# Patient Record
Sex: FEMALE | Race: WHITE | HISPANIC OR LATINO | Employment: UNEMPLOYED | URBAN - METROPOLITAN AREA
[De-identification: names, ages, dates, MRNs, and addresses within clinical notes are randomized per-mention and may not be internally consistent; named-entity substitution may affect disease eponyms.]

---

## 2018-05-14 ENCOUNTER — APPOINTMENT (OUTPATIENT)
Dept: RADIOLOGY | Facility: MEDICAL CENTER | Age: 28
End: 2018-05-14
Attending: EMERGENCY MEDICINE
Payer: MEDICAID

## 2018-05-14 ENCOUNTER — HOSPITAL ENCOUNTER (EMERGENCY)
Facility: MEDICAL CENTER | Age: 28
End: 2018-05-15
Attending: EMERGENCY MEDICINE
Payer: MEDICAID

## 2018-05-14 DIAGNOSIS — T81.89XA GRANULOMA OF SURGICAL WOUND, INITIAL ENCOUNTER: ICD-10-CM

## 2018-05-14 DIAGNOSIS — M54.50 ACUTE BILATERAL LOW BACK PAIN WITHOUT SCIATICA: ICD-10-CM

## 2018-05-14 LAB
ALBUMIN SERPL BCP-MCNC: 4.4 G/DL (ref 3.2–4.9)
ALBUMIN/GLOB SERPL: 1.5 G/DL
ALP SERPL-CCNC: 41 U/L (ref 30–99)
ALT SERPL-CCNC: 14 U/L (ref 2–50)
ANION GAP SERPL CALC-SCNC: 9 MMOL/L (ref 0–11.9)
APPEARANCE UR: CLEAR
AST SERPL-CCNC: 23 U/L (ref 12–45)
BACTERIA #/AREA URNS HPF: ABNORMAL /HPF
BASOPHILS # BLD AUTO: 0.4 % (ref 0–1.8)
BASOPHILS # BLD: 0.04 K/UL (ref 0–0.12)
BILIRUB SERPL-MCNC: 1.1 MG/DL (ref 0.1–1.5)
BILIRUB UR QL STRIP.AUTO: NEGATIVE
BUN SERPL-MCNC: 12 MG/DL (ref 8–22)
CALCIUM SERPL-MCNC: 9.3 MG/DL (ref 8.4–10.2)
CHLORIDE SERPL-SCNC: 102 MMOL/L (ref 96–112)
CO2 SERPL-SCNC: 25 MMOL/L (ref 20–33)
COLOR UR: YELLOW
CREAT SERPL-MCNC: 0.71 MG/DL (ref 0.5–1.4)
EOSINOPHIL # BLD AUTO: 0.08 K/UL (ref 0–0.51)
EOSINOPHIL NFR BLD: 0.9 % (ref 0–6.9)
EPI CELLS #/AREA URNS HPF: ABNORMAL /HPF
ERYTHROCYTE [DISTWIDTH] IN BLOOD BY AUTOMATED COUNT: 39.7 FL (ref 35.9–50)
GLOBULIN SER CALC-MCNC: 2.9 G/DL (ref 1.9–3.5)
GLUCOSE SERPL-MCNC: 93 MG/DL (ref 65–99)
GLUCOSE UR STRIP.AUTO-MCNC: NEGATIVE MG/DL
HCG UR QL: NEGATIVE
HCT VFR BLD AUTO: 43.2 % (ref 37–47)
HGB BLD-MCNC: 15 G/DL (ref 12–16)
IMM GRANULOCYTES # BLD AUTO: 0.01 K/UL (ref 0–0.11)
IMM GRANULOCYTES NFR BLD AUTO: 0.1 % (ref 0–0.9)
KETONES UR STRIP.AUTO-MCNC: NEGATIVE MG/DL
LEUKOCYTE ESTERASE UR QL STRIP.AUTO: NEGATIVE
LIPASE SERPL-CCNC: 27 U/L (ref 7–58)
LYMPHOCYTES # BLD AUTO: 2.57 K/UL (ref 1–4.8)
LYMPHOCYTES NFR BLD: 28.2 % (ref 22–41)
MCH RBC QN AUTO: 32.4 PG (ref 27–33)
MCHC RBC AUTO-ENTMCNC: 34.7 G/DL (ref 33.6–35)
MCV RBC AUTO: 93.3 FL (ref 81.4–97.8)
MICRO URNS: ABNORMAL
MONOCYTES # BLD AUTO: 0.58 K/UL (ref 0–0.85)
MONOCYTES NFR BLD AUTO: 6.4 % (ref 0–13.4)
MUCOUS THREADS #/AREA URNS HPF: ABNORMAL /HPF
NEUTROPHILS # BLD AUTO: 5.83 K/UL (ref 2–7.15)
NEUTROPHILS NFR BLD: 64 % (ref 44–72)
NITRITE UR QL STRIP.AUTO: NEGATIVE
NRBC # BLD AUTO: 0 K/UL
NRBC BLD-RTO: 0 /100 WBC
PH UR STRIP.AUTO: 6.5 [PH]
PLATELET # BLD AUTO: 210 K/UL (ref 164–446)
PMV BLD AUTO: 10.5 FL (ref 9–12.9)
POTASSIUM SERPL-SCNC: 3.1 MMOL/L (ref 3.6–5.5)
PROT SERPL-MCNC: 7.3 G/DL (ref 6–8.2)
PROT UR QL STRIP: NEGATIVE MG/DL
RBC # BLD AUTO: 4.63 M/UL (ref 4.2–5.4)
RBC # URNS HPF: ABNORMAL /HPF
RBC UR QL AUTO: ABNORMAL
SODIUM SERPL-SCNC: 136 MMOL/L (ref 135–145)
SP GR UR REFRACTOMETRY: 1.01
SP GR UR STRIP.AUTO: 1.01
WBC # BLD AUTO: 9.1 K/UL (ref 4.8–10.8)
WBC #/AREA URNS HPF: ABNORMAL /HPF

## 2018-05-14 PROCEDURE — 81025 URINE PREGNANCY TEST: CPT

## 2018-05-14 PROCEDURE — 80053 COMPREHEN METABOLIC PANEL: CPT

## 2018-05-14 PROCEDURE — 85025 COMPLETE CBC W/AUTO DIFF WBC: CPT

## 2018-05-14 PROCEDURE — 96374 THER/PROPH/DIAG INJ IV PUSH: CPT

## 2018-05-14 PROCEDURE — 76857 US EXAM PELVIC LIMITED: CPT

## 2018-05-14 PROCEDURE — 99285 EMERGENCY DEPT VISIT HI MDM: CPT

## 2018-05-14 PROCEDURE — 83690 ASSAY OF LIPASE: CPT

## 2018-05-14 PROCEDURE — 81001 URINALYSIS AUTO W/SCOPE: CPT

## 2018-05-14 ASSESSMENT — PAIN SCALES - GENERAL
PAINLEVEL_OUTOF10: 6
PAINLEVEL_OUTOF10: 6

## 2018-05-15 VITALS
WEIGHT: 113.98 LBS | OXYGEN SATURATION: 100 % | HEART RATE: 66 BPM | RESPIRATION RATE: 18 BRPM | SYSTOLIC BLOOD PRESSURE: 161 MMHG | TEMPERATURE: 98.2 F | HEIGHT: 65 IN | BODY MASS INDEX: 18.99 KG/M2 | DIASTOLIC BLOOD PRESSURE: 105 MMHG

## 2018-05-15 PROCEDURE — 700111 HCHG RX REV CODE 636 W/ 250 OVERRIDE (IP)

## 2018-05-15 RX ORDER — NAPROXEN 500 MG/1
500 TABLET ORAL 2 TIMES DAILY WITH MEALS
Qty: 20 TAB | Refills: 0 | Status: SHIPPED | OUTPATIENT
Start: 2018-05-15 | End: 2018-05-25

## 2018-05-15 RX ORDER — KETOROLAC TROMETHAMINE 30 MG/ML
30 INJECTION, SOLUTION INTRAMUSCULAR; INTRAVENOUS ONCE
Status: COMPLETED | OUTPATIENT
Start: 2018-05-15 | End: 2018-05-15

## 2018-05-15 RX ORDER — KETOROLAC TROMETHAMINE 30 MG/ML
INJECTION, SOLUTION INTRAMUSCULAR; INTRAVENOUS
Status: COMPLETED
Start: 2018-05-15 | End: 2018-05-15

## 2018-05-15 RX ADMIN — KETOROLAC TROMETHAMINE 30 MG: 30 INJECTION, SOLUTION INTRAMUSCULAR at 00:27

## 2018-05-15 RX ADMIN — KETOROLAC TROMETHAMINE 30 MG: 30 INJECTION, SOLUTION INTRAMUSCULAR; INTRAVENOUS at 00:27

## 2018-05-15 ASSESSMENT — PAIN SCALES - GENERAL: PAINLEVEL_OUTOF10: 7

## 2018-05-15 NOTE — DISCHARGE INSTRUCTIONS
Return immediately to the Emergency Department if you experience continuing or worsening discomfort in your abdomen or back, fever, chest pain, difficulty breathing or any other new or worsening symptoms.       Back Pain, Adult  Back pain is very common in adults. The cause of back pain is rarely dangerous and the pain often gets better over time. The cause of your back pain may not be known. Some common causes of back pain include:  · Strain of the muscles or ligaments supporting the spine.  · Wear and tear (degeneration) of the spinal disks.  · Arthritis.  · Direct injury to the back.  For many people, back pain may return. Since back pain is rarely dangerous, most people can learn to manage this condition on their own.  Follow these instructions at home:  Watch your back pain for any changes. The following actions may help to lessen any discomfort you are feeling:  · Remain active. It is stressful on your back to sit or  one place for long periods of time. Do not sit, drive, or  one place for more than 30 minutes at a time. Take short walks on even surfaces as soon as you are able. Try to increase the length of time you walk each day.  · Exercise regularly as directed by your health care provider. Exercise helps your back heal faster. It also helps avoid future injury by keeping your muscles strong and flexible.  · Do not stay in bed. Resting more than 1-2 days can delay your recovery.  · Pay attention to your body when you bend and lift. The most comfortable positions are those that put less stress on your recovering back. Always use proper lifting techniques, including:  ¨ Bending your knees.  ¨ Keeping the load close to your body.  ¨ Avoiding twisting.  · Find a comfortable position to sleep. Use a firm mattress and lie on your side with your knees slightly bent. If you lie on your back, put a pillow under your knees.  · Avoid feeling anxious or stressed. Stress increases muscle tension and can  worsen back pain. It is important to recognize when you are anxious or stressed and learn ways to manage it, such as with exercise.  · Take medicines only as directed by your health care provider. Over-the-counter medicines to reduce pain and inflammation are often the most helpful. Your health care provider may prescribe muscle relaxant drugs. These medicines help dull your pain so you can more quickly return to your normal activities and healthy exercise.  · Apply ice to the injured area:  ¨ Put ice in a plastic bag.  ¨ Place a towel between your skin and the bag.  ¨ Leave the ice on for 20 minutes, 2-3 times a day for the first 2-3 days. After that, ice and heat may be alternated to reduce pain and spasms.  · Maintain a healthy weight. Excess weight puts extra stress on your back and makes it difficult to maintain good posture.  Contact a health care provider if:  · You have pain that is not relieved with rest or medicine.  · You have increasing pain going down into the legs or buttocks.  · You have pain that does not improve in one week.  · You have night pain.  · You lose weight.  · You have a fever or chills.  Get help right away if:  · You develop new bowel or bladder control problems.  · You have unusual weakness or numbness in your arms or legs.  · You develop nausea or vomiting.  · You develop abdominal pain.  · You feel faint.  This information is not intended to replace advice given to you by your health care provider. Make sure you discuss any questions you have with your health care provider.  Document Released: 12/18/2006 Document Revised: 04/27/2017 Document Reviewed: 04/21/2015  Elsevier Interactive Patient Education © 2017 [employer]      Pain Without a Known Cause  Introduction  Pain can occur in any part of the body and can range from mild to severe. Sometimes no cause can be found for why you are having pain. Some types of pain that can occur without a known cause  include:  · Headache.  · Back pain.  · Abdominal pain.  · Neck pain.  How is this diagnosed?  Your health care provider will try to find the cause of your pain. This may include:  · Physical exam.  · Medical history.  · Blood tests.  · Urine tests.  · X-rays.  If no cause is found, your health care provider may diagnose you with pain without a known cause.  How is this treated?  Treatment depends on the kind of pain you have. Your health care provider may prescribe medicines to help relieve your pain.  Follow these instructions at home:  · Take medicines only as directed by your health care provider.  · Stop any activities that cause pain. During periods of severe pain, bed rest may help.  · Try to reduce your stress with activities such as yoga or meditation. Talk to your health care provider for other stress-reducing activity recommendations.  · Exercise regularly, if approved by your health care provider.  · Eat a healthy diet that includes fruits and vegetables. This may improve pain. Talk to your health care provider if you have any questions about your diet.  Contact a health care provider if:  If you have a painful condition and no reason can be found for the pain or the pain gets worse, it is important to follow up with your health care provider. It may be necessary to repeat tests and look further for a possible cause.  This information is not intended to replace advice given to you by your health care provider. Make sure you discuss any questions you have with your health care provider.  Document Released: 09/12/2002 Document Revised: 05/25/2017 Document Reviewed: 05/05/2015  © 2017 Elsevier

## 2018-05-15 NOTE — ED PROVIDER NOTES
"ED Provider Note    CHIEF COMPLAINT  Chief Complaint   Patient presents with   • LLQ Pain     Radiates to L flank       HPI  Lorenza Rodriguez is a 27 y.o. female who presents for evaluation of pelvic pain, dysuria as well as bilateral flank pain, worse on the left with regards of flank pain and pelvic pain. She also reports very mild amount of spotting, her last period ended about 5 days ago but was heavier than usual. Fever, no vomiting. No diarrhea. The patient has chronic pelvic pain associated with some subcutaneous nodules, she's had some removed in the past but states that the ones that are there now are bigger than usual although they're usually is some monthly fluctuations in size. She did not take any medicine at home for these symptoms. She offers no other specific complaints    REVIEW OF SYSTEMS  Negative for fever, rash, chest pain, dyspnea, vomiting, diarrhea, headache, focal weakness, focal numbness, focal tingling. All other systems are negative.     PAST MEDICAL HISTORY  History reviewed. No pertinent past medical history.    FAMILY HISTORY  History reviewed. No pertinent family history.    SOCIAL HISTORY  Social History   Substance Use Topics   • Smoking status: Never Smoker   • Smokeless tobacco: Never Used   • Alcohol use Yes       SURGICAL HISTORY  Past Surgical History:   Procedure Laterality Date   • REPEAT C SECTION  6/5/2014    Performed by Anika Larsen M.D. at [facility]   • PRIMARY C SECTION       2007       CURRENT MEDICATIONS  I personally reviewed the medication list in the charting documentation.     ALLERGIES  Allergies   Allergen Reactions   • Nkda [No Known Drug Allergy]        MEDICAL RECORD  I have reviewed patient's medical record and pertinent results are listed above.      PHYSICAL EXAM  VITAL SIGNS: BP (!) 161/105   Pulse 82   Temp 36.8 °C (98.2 °F)   Resp 18   Ht 1.651 m (5' 5\")   Wt 51.7 kg (113 lb 15.7 oz)   LMP 05/09/2018   SpO2 100%   BMI 18.97 " kg/m²    Constitutional: Well appearing patient in no acute distress.  Not toxic, nor ill in appearance.  HENT: Mucus membranes moist.    Eyes: No scleral icterus. Normal conjunctiva   Neck: Supple, comfortable, nonpainful range of motion.   Cardiovascular: Regular heart rate and rhythm.   Thorax & Lungs: Chest is nontender.  Lungs are clear to auscultation with good air movement bilaterally.  No wheeze, rhonchi, nor rales.   Abdomen: Soft, tenderness along the lower abdomen basically across the pelvis, she has a transverse  scar with multiple subcutaneous nodules that are very tender and freely mobile..  Skin: Warm, dry. No rash appreciated  Extremities/Musculoskeletal: No sign of trauma. No asymmetric calf tenderness, erythema or edema. Tenderness of the low back bilaterally, more so on the left side  Neurologic: Alert & oriented. No focal deficits observed.   Psychiatric: Normal affect appropriate for the clinical situation.    DIAGNOSTIC STUDIES / PROCEDURES    LABS  Results for orders placed or performed during the hospital encounter of 18   URINALYSIS,CULTURE IF INDICATED   Result Value Ref Range    Color Yellow     Character Clear     Specific Gravity 1.010 <1.035    Ph 6.5 5.0 - 8.0    Glucose Negative Negative mg/dL    Ketones Negative Negative mg/dL    Protein Negative Negative mg/dL    Bilirubin Negative Negative    Nitrite Negative Negative    Leukocyte Esterase Negative Negative    Occult Blood Trace (A) Negative    Micro Urine Req Microscopic    BETA-HCG QUALITATIVE URINE   Result Value Ref Range    Beta-Hcg Urine Negative Negative   REFRACTOMETER SG   Result Value Ref Range    Specific Gravity 1.010    URINE MICROSCOPIC (W/UA)   Result Value Ref Range    WBC Rare /hpf    RBC 0-2 /hpf    Bacteria Few (A) None /hpf    Epithelial Cells Few Few /hpf    Mucous Threads Few /hpf   CBC WITH DIFFERENTIAL   Result Value Ref Range    WBC 9.1 4.8 - 10.8 K/uL    RBC 4.63 4.20 - 5.40 M/uL     Hemoglobin 15.0 12.0 - 16.0 g/dL    Hematocrit 43.2 37.0 - 47.0 %    MCV 93.3 81.4 - 97.8 fL    MCH 32.4 27.0 - 33.0 pg    MCHC 34.7 33.6 - 35.0 g/dL    RDW 39.7 35.9 - 50.0 fL    Platelet Count 210 164 - 446 K/uL    MPV 10.5 9.0 - 12.9 fL    Neutrophils-Polys 64.00 44.00 - 72.00 %    Lymphocytes 28.20 22.00 - 41.00 %    Monocytes 6.40 0.00 - 13.40 %    Eosinophils 0.90 0.00 - 6.90 %    Basophils 0.40 0.00 - 1.80 %    Immature Granulocytes 0.10 0.00 - 0.90 %    Nucleated RBC 0.00 /100 WBC    Neutrophils (Absolute) 5.83 2.00 - 7.15 K/uL    Lymphs (Absolute) 2.57 1.00 - 4.80 K/uL    Monos (Absolute) 0.58 0.00 - 0.85 K/uL    Eos (Absolute) 0.08 0.00 - 0.51 K/uL    Baso (Absolute) 0.04 0.00 - 0.12 K/uL    Immature Granulocytes (abs) 0.01 0.00 - 0.11 K/uL    NRBC (Absolute) 0.00 K/uL   CMP   Result Value Ref Range    Sodium 136 135 - 145 mmol/L    Potassium 3.1 (L) 3.6 - 5.5 mmol/L    Chloride 102 96 - 112 mmol/L    Co2 25 20 - 33 mmol/L    Anion Gap 9.0 0.0 - 11.9    Glucose 93 65 - 99 mg/dL    Bun 12 8 - 22 mg/dL    Creatinine 0.71 0.50 - 1.40 mg/dL    Calcium 9.3 8.4 - 10.2 mg/dL    AST(SGOT) 23 12 - 45 U/L    ALT(SGPT) 14 2 - 50 U/L    Alkaline Phosphatase 41 30 - 99 U/L    Total Bilirubin 1.1 0.1 - 1.5 mg/dL    Albumin 4.4 3.2 - 4.9 g/dL    Total Protein 7.3 6.0 - 8.2 g/dL    Globulin 2.9 1.9 - 3.5 g/dL    A-G Ratio 1.5 g/dL   LIPASE   Result Value Ref Range    Lipase 27 7 - 58 U/L   ESTIMATED GFR   Result Value Ref Range    GFR If African American >60 >60 mL/min/1.73 m 2    GFR If Non African American >60 >60 mL/min/1.73 m 2         RADIOLOGY  US-PELVIS TRANSABDOMINAL LIMITED   Final Result         3 nonspecific hypoechoic masses in the left lower quadrant. This could be seen with scarring, injection granulomas, fat necrosis, etc... Malignancy cannot be entirely excluded.            COURSE & MEDICAL DECISION MAKING  I have reviewed any medical record information, laboratory studies and radiographic results as noted  above.  Differential diagnoses includes: Pyelonephritis, pregnancy, ectopic pregnancy, granuloma, abscess    Encounter Summary: This is a 27 y.o. female with dysuria and flank pain, concerning for pyelonephritis, she also has lower abdominal pain which might represent some tenderness from cystitis although she does have these nodules underlying her  scar which she states are chronic but tender, there are some monthly variations in size and tenderness which is again chronic for her.  She said they hurt more than usual however. She also has his back pain with associated tenderness on exam increasing the likelihood of a musculoskeletal etiology. We'll check a urinalysis, pregnancy test, blood work as well as an ultrasound of these nodules and then reevaluate ------ basically unremarkable laboratory evaluation, no evidence of UTI, blood work is unremarkable. The ultrasound reveals the subcutaneous nodules with a rather broad differential, at this point I think no more urgent or emergent evaluation is needed here in the emergency department, think she'll be treated with Toradol, prescribed naproxen and she will follow-up with her primary care physician as well as her gynecologist, strict return instructions have been discussed      DISPOSITION: Discharge home in stable condition      FINAL IMPRESSION  1. Granuloma of surgical wound, initial encounter    2. Acute bilateral low back pain without sciatica           This dictation was created using voice recognition software. The accuracy of the dictation is limited to the abilities of the software. I expect there may be some errors of grammar and possibly content. The nursing notes were reviewed and certain aspects of this information were incorporated into this note.    Electronically signed by: Marlo Simental, 2018 11:09 PM

## 2018-05-25 ENCOUNTER — OFFICE VISIT (OUTPATIENT)
Dept: MEDICAL GROUP | Facility: MEDICAL CENTER | Age: 28
End: 2018-05-25
Attending: INTERNAL MEDICINE
Payer: MEDICAID

## 2018-05-25 VITALS
SYSTOLIC BLOOD PRESSURE: 118 MMHG | DIASTOLIC BLOOD PRESSURE: 72 MMHG | BODY MASS INDEX: 19.16 KG/M2 | TEMPERATURE: 98.3 F | OXYGEN SATURATION: 98 % | WEIGHT: 115 LBS | HEART RATE: 60 BPM | HEIGHT: 65 IN | RESPIRATION RATE: 16 BRPM

## 2018-05-25 DIAGNOSIS — B00.9 HSV-1 (HERPES SIMPLEX VIRUS 1) INFECTION: ICD-10-CM

## 2018-05-25 DIAGNOSIS — R19.04 ABDOMINAL MASS, LEFT LOWER QUADRANT: ICD-10-CM

## 2018-05-25 DIAGNOSIS — Z30.09 COUNSELING FOR INITIATION OF BIRTH CONTROL METHOD: ICD-10-CM

## 2018-05-25 LAB
INT CON NEG: NEGATIVE
INT CON POS: POSITIVE
POC URINE PREGNANCY TEST: NEGATIVE

## 2018-05-25 PROCEDURE — 99204 OFFICE O/P NEW MOD 45 MIN: CPT | Performed by: INTERNAL MEDICINE

## 2018-05-25 PROCEDURE — 81025 URINE PREGNANCY TEST: CPT | Performed by: INTERNAL MEDICINE

## 2018-05-25 RX ORDER — VALACYCLOVIR HYDROCHLORIDE 1 G/1
TABLET, FILM COATED ORAL
Qty: 10 TAB | Refills: 1 | Status: SHIPPED | OUTPATIENT
Start: 2018-05-25 | End: 2019-08-29

## 2018-05-25 RX ORDER — NORELGESTROMIN AND ETHINYL ESTRADIOL 35; 150 UG/MG; UG/MG
1 PATCH TRANSDERMAL
Qty: 4 PATCH | Refills: 3 | Status: SHIPPED | OUTPATIENT
Start: 2018-05-25 | End: 2018-10-22 | Stop reason: SDUPTHER

## 2018-05-25 ASSESSMENT — PATIENT HEALTH QUESTIONNAIRE - PHQ9: CLINICAL INTERPRETATION OF PHQ2 SCORE: 0

## 2018-05-25 ASSESSMENT — PAIN SCALES - GENERAL: PAINLEVEL: 5=MODERATE PAIN

## 2018-05-25 NOTE — PROGRESS NOTES
Lorenza Rodriguez is a 27 y.o. female here for birth control, painful masses in abdomen, establish care  HPI:    HSV-1 (herpes simplex virus 1) infection  Patient reports rare cold sore outbreaks.  Has one currently but last outbreak was 7 years ago.  Denies genital lesions.  Current sore has been present x 1 week.    Counseling for initiation of birth control method  Patient would like to start on birth control.  Has been on OCPs in the past but struggles to take them regularly.  Has also had an IUD before but did not like it.  Is currently sexually active and uses condoms.  Would like to try an alternative method of birth control.    Abdominal mass, left lower quadrant  Patient reports a history of recurrent masses in the lower abdominal region near her C section scar.  She has had them removed on two separate occasions, last 4 years ago.  She has noticed a recurrence about a year ago on the left.  The masses get very sore especially around the time of her periods.  She recently went to the ER for evaluation and had an ultrasound of the area which showed 3 approx 2 cm nodules with could be related to scaring.  She does not inject any medications so unilkely injection granuloma.  Denies fevers, chills, unintentional weight loss.    Current medicines (including changes today)  Current Outpatient Prescriptions   Medication Sig Dispense Refill   • norelgestromin-ethinyl estradiol (ORTHO EVRA) 150-35 MCG/24HR patch Apply 1 Patch to skin as directed every 7 days. 4 Patch 3   • valacyclovir (VALTREX) 1 GM Tab Take 1 tab daily for 5 days at first sign of cold sore 10 Tab 1     No current facility-administered medications for this visit.      She  has a past medical history of Inguinal mass.  She  has a past surgical history that includes primary c section; repeat c section (6/5/2014); and mass excision general.  Social History   Substance Use Topics   • Smoking status: Never Smoker   • Smokeless tobacco: Never Used   •  "Alcohol use Yes      Comment: few drinks per month     Social History     Social History Narrative   • No narrative on file     Family History   Problem Relation Age of Onset   • Other Mother      chron's   • Heart Disease Mother    • Hypertension Mother    • Hyperlipidemia Mother    • Stroke Mother    • Cancer Paternal Aunt      breast   • Diabetes Neg Hx          ROS  As above in HPI  All other systems reviewed and are negative     Objective:     Blood pressure 118/72, pulse 60, temperature 36.8 °C (98.3 °F), resp. rate 16, height 1.651 m (5' 5\"), weight 52.2 kg (115 lb), last menstrual period 05/09/2018, SpO2 98 %, not currently breastfeeding. Body mass index is 19.14 kg/m².  Physical Exam:    Constitutional: Alert, no distress.  Skin: Warm, dry, good turgor, no rashes in visible areas.  Eye: Equal, round and reactive, conjunctiva clear, lids normal.  ENMT: cold sore over lower lip, good dentition, oropharynx clear, TM's clear bilaterally.  Neck: Trachea midline, no masses, no thyromegaly. No cervical or supraclavicular lymphadenopathy.  Respiratory: Unlabored respiratory effort, lungs clear to auscultation, no wheezes, no ronchi.  Cardiovascular: Regular rate and rhythm, no murmurs appreciated, no lower extremity edema.  Abdomen: Soft, non-tender, 2 palpable firm round nodules just lateral to her C section scar on the left, each approx 2 cm in size and currently non-tender, no hepatosplenomegaly.  Psych: Alert and oriented x3, normal affect and mood.    Results:  Transabdominal US (5/14/18)  FINDINGS:    There are 3 solid subcutaneous hypoechoic masses under the skin with internal flow in the left lower quadrant. The nodules measure 2.0 x 1.0 x 1.7 cm, 1.9 X 1.2 X 2.3 cm and 1.7 X 1.0 X 1.6 cm.     Impression         3 nonspecific hypoechoic masses in the left lower quadrant. This could be seen with scarring, injection granulomas, fat necrosis, etc... Malignancy cannot be entirely excluded.     POC pregnancy " test done in clinic today was negative      Assessment and Plan:   The following treatment plan was discussed    1. Counseling for initiation of birth control method  Negative pregnancy test.  Patient desires to start on birth control patch.  If this is not effective for her, her next choice would be the depo shot.  - norelgestromin-ethinyl estradiol (ORTHO EVRA) 150-35 MCG/24HR patch; Apply 1 Patch to skin as directed every 7 days.  Dispense: 4 Patch; Refill: 3  - POCT Pregnancy    2. Abdominal mass, left lower quadrant  Superficial, suspect they are related to her scarring although they are very round and well defined.  Given that they are painful, she would like them removed and I have placed a referral to surgery today.  - REFERRAL TO GENERAL SURGERY    3. HSV-1 (herpes simplex virus 1) infection  With current outbreak starting 1 week ago.  Script for valtrex to be used at first signs of subsequent outbreak, discussed it will not be effective for current outbreak as it has been a week since sx started.  - valacyclovir (VALTREX) 1 GM Tab; Take 1 tab daily for 5 days at first sign of cold sore  Dispense: 10 Tab; Refill: 1    HCM  Last PAP 4 years ago  Last Tdap 4 years ago (will confirm on [employer])      Followup: Return in about 4 weeks (around 6/22/2018), or if symptoms worsen or fail to improve, for PAP.

## 2018-05-25 NOTE — ASSESSMENT & PLAN NOTE
Patient reports a history of recurrent masses in the lower abdominal region near her C section scar.  She has had them removed on two separate occasions, last 4 years ago.  She has noticed a recurrence about a year ago on the left.  The masses get very sore especially around the time of her periods.  She recently went to the ER for evaluation and had an ultrasound of the area which showed 3 approx 2 cm nodules with could be related to scaring.  She does not inject any medications so unilkely injection granuloma.  Denies fevers, chills, unintentional weight loss.

## 2018-05-25 NOTE — ASSESSMENT & PLAN NOTE
Patient reports rare cold sore outbreaks.  Has one currently but last outbreak was 7 years ago.  Denies genital lesions.  Current sore has been present x 1 week.

## 2018-05-25 NOTE — ASSESSMENT & PLAN NOTE
Patient would like to start on birth control.  Has been on OCPs in the past but struggles to take them regularly.  Has also had an IUD before but did not like it.  Is currently sexually active and uses condoms.  Would like to try an alternative method of birth control.

## 2018-10-22 DIAGNOSIS — Z30.09 COUNSELING FOR INITIATION OF BIRTH CONTROL METHOD: ICD-10-CM

## 2018-10-23 RX ORDER — NORELGESTROMIN AND ETHINYL ESTRADIOL 150; 35 UG/D; UG/D
1 PATCH TRANSDERMAL
Qty: 4 PATCH | Refills: 6 | Status: SHIPPED | OUTPATIENT
Start: 2018-10-23 | End: 2019-08-29

## 2019-07-11 ENCOUNTER — APPOINTMENT (OUTPATIENT)
Dept: RADIOLOGY | Facility: MEDICAL CENTER | Age: 29
End: 2019-07-11
Attending: EMERGENCY MEDICINE
Payer: MEDICAID

## 2019-07-11 ENCOUNTER — HOSPITAL ENCOUNTER (EMERGENCY)
Facility: MEDICAL CENTER | Age: 29
End: 2019-07-11
Attending: EMERGENCY MEDICINE
Payer: MEDICAID

## 2019-07-11 VITALS
HEART RATE: 89 BPM | OXYGEN SATURATION: 100 % | HEIGHT: 63 IN | RESPIRATION RATE: 16 BRPM | DIASTOLIC BLOOD PRESSURE: 89 MMHG | TEMPERATURE: 98 F | WEIGHT: 117.95 LBS | BODY MASS INDEX: 20.9 KG/M2 | SYSTOLIC BLOOD PRESSURE: 127 MMHG

## 2019-07-11 DIAGNOSIS — N12 PYELONEPHRITIS: ICD-10-CM

## 2019-07-11 LAB
ALBUMIN SERPL BCP-MCNC: 4.5 G/DL (ref 3.2–4.9)
ALBUMIN/GLOB SERPL: 1.5 G/DL
ALP SERPL-CCNC: 42 U/L (ref 30–99)
ALT SERPL-CCNC: 10 U/L (ref 2–50)
ANION GAP SERPL CALC-SCNC: 11 MMOL/L (ref 0–11.9)
APPEARANCE UR: ABNORMAL
AST SERPL-CCNC: 18 U/L (ref 12–45)
BACTERIA #/AREA URNS HPF: ABNORMAL /HPF
BASOPHILS # BLD AUTO: 0.2 % (ref 0–1.8)
BASOPHILS # BLD: 0.02 K/UL (ref 0–0.12)
BILIRUB SERPL-MCNC: 1 MG/DL (ref 0.1–1.5)
BILIRUB UR QL STRIP.AUTO: NEGATIVE
BUN SERPL-MCNC: 13 MG/DL (ref 8–22)
CALCIUM SERPL-MCNC: 9.3 MG/DL (ref 8.4–10.2)
CHLORIDE SERPL-SCNC: 102 MMOL/L (ref 96–112)
CO2 SERPL-SCNC: 25 MMOL/L (ref 20–33)
COLOR UR: YELLOW
CREAT SERPL-MCNC: 0.69 MG/DL (ref 0.5–1.4)
EOSINOPHIL # BLD AUTO: 0.04 K/UL (ref 0–0.51)
EOSINOPHIL NFR BLD: 0.3 % (ref 0–6.9)
EPI CELLS #/AREA URNS HPF: ABNORMAL /HPF
ERYTHROCYTE [DISTWIDTH] IN BLOOD BY AUTOMATED COUNT: 40.1 FL (ref 35.9–50)
GLOBULIN SER CALC-MCNC: 3 G/DL (ref 1.9–3.5)
GLUCOSE SERPL-MCNC: 88 MG/DL (ref 65–99)
GLUCOSE UR STRIP.AUTO-MCNC: NEGATIVE MG/DL
HCG SERPL QL: NEGATIVE
HCT VFR BLD AUTO: 42.4 % (ref 37–47)
HGB BLD-MCNC: 14.4 G/DL (ref 12–16)
IMM GRANULOCYTES # BLD AUTO: 0.04 K/UL (ref 0–0.11)
IMM GRANULOCYTES NFR BLD AUTO: 0.3 % (ref 0–0.9)
KETONES UR STRIP.AUTO-MCNC: NEGATIVE MG/DL
LEUKOCYTE ESTERASE UR QL STRIP.AUTO: ABNORMAL
LIPASE SERPL-CCNC: 33 U/L (ref 7–58)
LYMPHOCYTES # BLD AUTO: 1.59 K/UL (ref 1–4.8)
LYMPHOCYTES NFR BLD: 13.3 % (ref 22–41)
MCH RBC QN AUTO: 31.6 PG (ref 27–33)
MCHC RBC AUTO-ENTMCNC: 34 G/DL (ref 33.6–35)
MCV RBC AUTO: 93 FL (ref 81.4–97.8)
MICRO URNS: ABNORMAL
MONOCYTES # BLD AUTO: 0.78 K/UL (ref 0–0.85)
MONOCYTES NFR BLD AUTO: 6.5 % (ref 0–13.4)
NEUTROPHILS # BLD AUTO: 9.52 K/UL (ref 2–7.15)
NEUTROPHILS NFR BLD: 79.4 % (ref 44–72)
NITRITE UR QL STRIP.AUTO: POSITIVE
NRBC # BLD AUTO: 0 K/UL
NRBC BLD-RTO: 0 /100 WBC
PH UR STRIP.AUTO: 6 [PH]
PLATELET # BLD AUTO: 185 K/UL (ref 164–446)
PMV BLD AUTO: 10.8 FL (ref 9–12.9)
POTASSIUM SERPL-SCNC: 3.8 MMOL/L (ref 3.6–5.5)
PROT SERPL-MCNC: 7.5 G/DL (ref 6–8.2)
PROT UR QL STRIP: 100 MG/DL
RBC # BLD AUTO: 4.56 M/UL (ref 4.2–5.4)
RBC # URNS HPF: ABNORMAL /HPF
RBC UR QL AUTO: ABNORMAL
SODIUM SERPL-SCNC: 138 MMOL/L (ref 135–145)
SP GR UR STRIP.AUTO: 1.02
WBC # BLD AUTO: 12 K/UL (ref 4.8–10.8)
WBC #/AREA URNS HPF: ABNORMAL /HPF

## 2019-07-11 PROCEDURE — 99285 EMERGENCY DEPT VISIT HI MDM: CPT

## 2019-07-11 PROCEDURE — 74176 CT ABD & PELVIS W/O CONTRAST: CPT

## 2019-07-11 PROCEDURE — 87086 URINE CULTURE/COLONY COUNT: CPT

## 2019-07-11 PROCEDURE — 87186 SC STD MICRODIL/AGAR DIL: CPT

## 2019-07-11 PROCEDURE — 700105 HCHG RX REV CODE 258: Performed by: EMERGENCY MEDICINE

## 2019-07-11 PROCEDURE — 700102 HCHG RX REV CODE 250 W/ 637 OVERRIDE(OP): Performed by: EMERGENCY MEDICINE

## 2019-07-11 PROCEDURE — 96365 THER/PROPH/DIAG IV INF INIT: CPT

## 2019-07-11 PROCEDURE — 87077 CULTURE AEROBIC IDENTIFY: CPT

## 2019-07-11 PROCEDURE — 700111 HCHG RX REV CODE 636 W/ 250 OVERRIDE (IP): Performed by: EMERGENCY MEDICINE

## 2019-07-11 PROCEDURE — 96375 TX/PRO/DX INJ NEW DRUG ADDON: CPT

## 2019-07-11 PROCEDURE — 83690 ASSAY OF LIPASE: CPT

## 2019-07-11 PROCEDURE — 36415 COLL VENOUS BLD VENIPUNCTURE: CPT

## 2019-07-11 PROCEDURE — 85025 COMPLETE CBC W/AUTO DIFF WBC: CPT

## 2019-07-11 PROCEDURE — 84703 CHORIONIC GONADOTROPIN ASSAY: CPT

## 2019-07-11 PROCEDURE — 80053 COMPREHEN METABOLIC PANEL: CPT

## 2019-07-11 PROCEDURE — 81001 URINALYSIS AUTO W/SCOPE: CPT

## 2019-07-11 PROCEDURE — A9270 NON-COVERED ITEM OR SERVICE: HCPCS | Performed by: EMERGENCY MEDICINE

## 2019-07-11 RX ORDER — ONDANSETRON 4 MG/1
4 TABLET, ORALLY DISINTEGRATING ORAL EVERY 8 HOURS PRN
Qty: 10 TAB | Refills: 0 | Status: SHIPPED | OUTPATIENT
Start: 2019-07-11 | End: 2019-07-11

## 2019-07-11 RX ORDER — MORPHINE SULFATE 10 MG/ML
6 INJECTION, SOLUTION INTRAMUSCULAR; INTRAVENOUS ONCE
Status: COMPLETED | OUTPATIENT
Start: 2019-07-11 | End: 2019-07-11

## 2019-07-11 RX ORDER — SODIUM CHLORIDE 9 MG/ML
1000 INJECTION, SOLUTION INTRAVENOUS ONCE
Status: COMPLETED | OUTPATIENT
Start: 2019-07-11 | End: 2019-07-11

## 2019-07-11 RX ORDER — CEFDINIR 300 MG/1
300 CAPSULE ORAL ONCE
Status: COMPLETED | OUTPATIENT
Start: 2019-07-11 | End: 2019-07-11

## 2019-07-11 RX ORDER — ONDANSETRON 2 MG/ML
4 INJECTION INTRAMUSCULAR; INTRAVENOUS ONCE
Status: COMPLETED | OUTPATIENT
Start: 2019-07-11 | End: 2019-07-11

## 2019-07-11 RX ORDER — CEFDINIR 300 MG/1
300 CAPSULE ORAL 2 TIMES DAILY
Qty: 20 CAP | Refills: 0 | Status: SHIPPED | OUTPATIENT
Start: 2019-07-11 | End: 2019-08-29

## 2019-07-11 RX ORDER — ACETAMINOPHEN 325 MG/1
650 TABLET ORAL ONCE
Status: COMPLETED | OUTPATIENT
Start: 2019-07-11 | End: 2019-07-11

## 2019-07-11 RX ORDER — KETOROLAC TROMETHAMINE 30 MG/ML
15 INJECTION, SOLUTION INTRAMUSCULAR; INTRAVENOUS ONCE
Status: COMPLETED | OUTPATIENT
Start: 2019-07-11 | End: 2019-07-11

## 2019-07-11 RX ORDER — ONDANSETRON 4 MG/1
4 TABLET, ORALLY DISINTEGRATING ORAL EVERY 8 HOURS PRN
Qty: 10 TAB | Refills: 0 | Status: SHIPPED | OUTPATIENT
Start: 2019-07-11 | End: 2019-07-16

## 2019-07-11 RX ADMIN — ONDANSETRON HYDROCHLORIDE 4 MG: 2 INJECTION, SOLUTION INTRAMUSCULAR; INTRAVENOUS at 17:47

## 2019-07-11 RX ADMIN — ACETAMINOPHEN 650 MG: 325 TABLET, FILM COATED ORAL at 19:58

## 2019-07-11 RX ADMIN — MORPHINE SULFATE 6 MG: 10 INJECTION INTRAVENOUS at 17:49

## 2019-07-11 RX ADMIN — KETOROLAC TROMETHAMINE 15 MG: 30 INJECTION, SOLUTION INTRAMUSCULAR at 17:52

## 2019-07-11 RX ADMIN — CEFTRIAXONE SODIUM 2 G: 2 INJECTION, POWDER, FOR SOLUTION INTRAMUSCULAR; INTRAVENOUS at 17:52

## 2019-07-11 RX ADMIN — CEFDINIR 300 MG: 300 CAPSULE ORAL at 19:58

## 2019-07-11 RX ADMIN — SODIUM CHLORIDE 1000 ML: 9 INJECTION, SOLUTION INTRAVENOUS at 17:48

## 2019-07-11 ASSESSMENT — ENCOUNTER SYMPTOMS
WEAKNESS: 0
RESPIRATORY NEGATIVE: 1
BRUISES/BLEEDS EASILY: 0
HALLUCINATIONS: 0
SEIZURES: 0
BLOOD IN STOOL: 0
VOMITING: 1
CONSTITUTIONAL NEGATIVE: 1
BACK PAIN: 0
SHORTNESS OF BREATH: 0
SORE THROAT: 0
EYE PAIN: 0
EYES NEGATIVE: 1
FLANK PAIN: 1
NECK PAIN: 0
ABDOMINAL PAIN: 0
FOCAL WEAKNESS: 0
CARDIOVASCULAR NEGATIVE: 1
HEADACHES: 0
MYALGIAS: 0
FEVER: 0

## 2019-07-11 NOTE — ED TRIAGE NOTES
"Chief Complaint   Patient presents with   • Flank Pain     right sided   • Blood in Urine   • N/V     Pt in obvious discomfort.  /93   Pulse 85   Temp 36.7 °C (98 °F) (Temporal)   Resp 18   Ht 1.6 m (5' 3\")   Wt 53.5 kg (117 lb 15.1 oz)   SpO2 99%   Pt informed of wait times. Educated on triage process.  Asked to return to triage RN for any new or worsening of symptoms. Thanked for patience.        "

## 2019-07-12 NOTE — ED NOTES
Report from Bijal Horton for lunch break. Pt assessed and medicated as ordered. Pt reports pain went from 10/10 to 0/10 after morphine administration. IV abx and fluids infusing. Will cont to monitor

## 2019-07-12 NOTE — DISCHARGE INSTRUCTIONS
Today your CT scan shows:   Right perinephric stranding. Correlation with urinalysis is recommended as this can be seen in the setting of pyelonephritis.    No hydronephrosis is identified. Punctate density in the right pelvis most likely represents a tiny phlebolith.    Trace free fluid in the pelvis.

## 2019-07-12 NOTE — ED PROVIDER NOTES
ED Provider Note    CHIEF COMPLAINT  Chief Complaint   Patient presents with   • Flank Pain     right sided   • Blood in Urine   • N/V       HPI  HPI     28 y.o. F presents to the emergency department with chief complaint of hematuria and right-sided flank pain for last 2 days.  Patient reports that she has had intermittent episodes of vomiting after pain began.  Patient denies any recent trauma or foreign travel.  Patient denies prior history of nephrolithiasis.  Patient denies any family history of anticoagulation or bleeding disorders.  Patient denies any new or different vaginal discharge.  Patient denies any cough or abdominal pain.  Patient denies any new or different vaginal discharge.      REVIEW OF SYSTEMS  Review of Systems   Constitutional: Negative.  Negative for fever.   HENT: Negative.  Negative for ear pain and sore throat.    Eyes: Negative.  Negative for pain.   Respiratory: Negative.  Negative for shortness of breath.    Cardiovascular: Negative.  Negative for chest pain.   Gastrointestinal: Positive for vomiting. Negative for abdominal pain and blood in stool.   Genitourinary: Positive for flank pain and hematuria. Negative for dysuria.   Musculoskeletal: Negative for back pain, myalgias and neck pain.   Skin: Negative.  Negative for rash.   Neurological: Negative for focal weakness, seizures, weakness and headaches.   Endo/Heme/Allergies: Does not bruise/bleed easily.   Psychiatric/Behavioral: Negative for hallucinations and suicidal ideas.   All other systems reviewed and are negative.      PAST MEDICAL HISTORY   has a past medical history of Inguinal mass.    SOCIAL HISTORY  Social History     Social History Main Topics   • Smoking status: Never Smoker   • Smokeless tobacco: Never Used   • Alcohol use Yes      Comment: few drinks per month   • Drug use: No   • Sexual activity: Yes     Partners: Male       SURGICAL HISTORY   has a past surgical history that includes primary c section; repeat c  "section (6/5/2014); and mass excision general.    CURRENT MEDICATIONS  Home Medications     Reviewed by Evie Suero R.N. (Registered Nurse) on 07/11/19 at 1553  Med List Status: Partial   Medication Last Dose Status   valacyclovir (VALTREX) 1 GM Tab  Active   XULANE 150-35 MCG/24HR patch  Active                ALLERGIES  Allergies   Allergen Reactions   • Nkda [No Known Drug Allergy]        PHYSICAL EXAM  VITAL SIGNS: /89   Pulse 89   Temp 36.7 °C (98 °F) (Temporal)   Resp 16   Ht 1.6 m (5' 3\")   Wt 53.5 kg (117 lb 15.1 oz)   LMP 07/04/2019 (Exact Date)   SpO2 100%   BMI 20.89 kg/m²  @ROSSANA[[id number]::@  Pulse ox interpretation: I interpret this pulse ox as normal.    Physical Exam   Constitutional: She is oriented to person, place, and time and well-developed, well-nourished, and in no distress.   HENT:   Head: Normocephalic and atraumatic.   Right Ear: External ear normal.   Left Ear: External ear normal.   Eyes: Conjunctivae and EOM are normal. No scleral icterus.   Neck: Normal range of motion.   Cardiovascular: Normal rate.    Pulmonary/Chest: Effort normal. No stridor. No respiratory distress. She has no wheezes.   Abdominal: She exhibits no distension. There is no tenderness.   +R CVA TTP.   Musculoskeletal: Normal range of motion. She exhibits no edema or deformity.   Neurological: She is alert and oriented to person, place, and time. Coordination normal.   Skin: Skin is warm and dry. No rash noted. No erythema.   Psychiatric: Affect and judgment normal.       DIAGNOSTIC STUDIES / PROCEDURES    LABS/EKG  Results for orders placed or performed during the hospital encounter of 07/11/19   CBC WITH DIFFERENTIAL   Result Value Ref Range    WBC 12.0 (H) 4.8 - 10.8 K/uL    RBC 4.56 4.20 - 5.40 M/uL    Hemoglobin 14.4 12.0 - 16.0 g/dL    Hematocrit 42.4 37.0 - 47.0 %    MCV 93.0 81.4 - 97.8 fL    MCH 31.6 27.0 - 33.0 pg    MCHC 34.0 33.6 - 35.0 g/dL    RDW 40.1 35.9 - 50.0 fL    Platelet Count 185 " 164 - 446 K/uL    MPV 10.8 9.0 - 12.9 fL    Neutrophils-Polys 79.40 (H) 44.00 - 72.00 %    Lymphocytes 13.30 (L) 22.00 - 41.00 %    Monocytes 6.50 0.00 - 13.40 %    Eosinophils 0.30 0.00 - 6.90 %    Basophils 0.20 0.00 - 1.80 %    Immature Granulocytes 0.30 0.00 - 0.90 %    Nucleated RBC 0.00 /100 WBC    Neutrophils (Absolute) 9.52 (H) 2.00 - 7.15 K/uL    Lymphs (Absolute) 1.59 1.00 - 4.80 K/uL    Monos (Absolute) 0.78 0.00 - 0.85 K/uL    Eos (Absolute) 0.04 0.00 - 0.51 K/uL    Baso (Absolute) 0.02 0.00 - 0.12 K/uL    Immature Granulocytes (abs) 0.04 0.00 - 0.11 K/uL    NRBC (Absolute) 0.00 K/uL   COMP METABOLIC PANEL   Result Value Ref Range    Sodium 138 135 - 145 mmol/L    Potassium 3.8 3.6 - 5.5 mmol/L    Chloride 102 96 - 112 mmol/L    Co2 25 20 - 33 mmol/L    Anion Gap 11.0 0.0 - 11.9    Glucose 88 65 - 99 mg/dL    Bun 13 8 - 22 mg/dL    Creatinine 0.69 0.50 - 1.40 mg/dL    Calcium 9.3 8.4 - 10.2 mg/dL    AST(SGOT) 18 12 - 45 U/L    ALT(SGPT) 10 2 - 50 U/L    Alkaline Phosphatase 42 30 - 99 U/L    Total Bilirubin 1.0 0.1 - 1.5 mg/dL    Albumin 4.5 3.2 - 4.9 g/dL    Total Protein 7.5 6.0 - 8.2 g/dL    Globulin 3.0 1.9 - 3.5 g/dL    A-G Ratio 1.5 g/dL   LIPASE   Result Value Ref Range    Lipase 33 7 - 58 U/L   HCG QUAL SERUM   Result Value Ref Range    Beta-Hcg Qualitative Serum Negative Negative   URINALYSIS,CULTURE IF INDICATED   Result Value Ref Range    Color Yellow     Character Cloudy (A)     Specific Gravity 1.025 <1.035    Ph 6.0 5.0 - 8.0    Glucose Negative Negative mg/dL    Ketones Negative Negative mg/dL    Protein 100 (A) Negative mg/dL    Bilirubin Negative Negative    Nitrite Positive (A) Negative    Leukocyte Esterase Moderate (A) Negative    Occult Blood Large (A) Negative    Micro Urine Req Microscopic    URINE MICROSCOPIC (W/UA)   Result Value Ref Range    -150 (A) /hpf    RBC 2-5 (A) /hpf    Bacteria Many (A) None /hpf    Epithelial Cells Few Few /hpf   ESTIMATED GFR   Result Value Ref  Range    GFR If African American >60 >60 mL/min/1.73 m 2    GFR If Non African American >60 >60 mL/min/1.73 m 2       RADIOLOGY  CT-RENAL COLIC EVALUATION(A/P W/O)   Final Result      Right perinephric stranding. Correlation with urinalysis is recommended as this can be seen in the setting of pyelonephritis.      No hydronephrosis is identified. Punctate density in the right pelvis most likely represents a tiny phlebolith.      Trace free fluid in the pelvis.                    COURSE & MEDICAL DECISION MAKING  Pertinent Labs & Imaging studies reviewed by me. (See chart for details)    28 y.o. Female  p/w R flank pain and hematuria.     Differential diagnosis includes but is not limited to:  History concerning for nephrolithiasis versus pyelonephritis  CT scan ordered to rule out obstructing stone given infected urine  Patient given first dose of ceftriaxone in the emergency department  No obstructing nephrolithiasis seen  Patient given first dose of Cefdinir in emergency department along with 10-day prescription  I counseled patient regarding return precautions including rigors and worsening pain    Patient agrees to follow-up and have repeat urine testing performed upon resolution of antibx.     Patient tolerating p.o. prior to discharge.    Intravenous fluids administered for vomiting.  Patient not appropriate for oral rehydration, as surgical process needs to be ruled out before trial of oral rehydration.   On repeat evaluation, pt w/ improved sx.  Pt w/ positive fluid response.       FINAL IMPRESSION  Visit Diagnoses     ICD-10-CM   1. Pyelonephritis N12              Electronically signed by: Arya Wray, 7/11/2019 5:38 PM

## 2019-07-12 NOTE — ED NOTES
Dc instructions and prescriptions reviewed with pt. Aware of need to  meds at [facility] on [facility], f/u with pcp, return for worsening s/s. To receive ride home due to meds recvd in er

## 2019-07-14 LAB
BACTERIA UR CULT: ABNORMAL
BACTERIA UR CULT: ABNORMAL
SIGNIFICANT IND 70042: ABNORMAL
SITE SITE: ABNORMAL
SOURCE SOURCE: ABNORMAL

## 2019-07-16 NOTE — ED NOTES
ED Positive Culture Follow-up/Notification Note:    Date: 7/16/19     Patient seen in the ED on 7/11/2019 for hematuria and right sided flank pain x 2 days.   1. Pyelonephritis      Given Rocephin 2 IV once.     Discharge Medication List as of 7/11/2019  7:59 PM      START taking these medications    Details   cefdinir (OMNICEF) 300 MG Cap Take 1 Cap by mouth 2 times a day., Disp-20 Cap, R-0, Normal      ondansetron (ZOFRAN ODT) 4 MG TABLET DISPERSIBLE Take 1 Tab by mouth every 8 hours as needed for Nausea for up to 5 days., Disp-10 Tab, R-0, Print Rx Paper             Allergies: Nkda [no known drug allergy]     Vitals:    07/11/19 1852 07/11/19 1910 07/11/19 1934 07/11/19 2000   BP:       Pulse: 83 67 69 89   Resp:       Temp:       TempSrc:       SpO2: 99% 96% 100% 100%   Weight:       Height:           Final cultures:   Results     Procedure Component Value Units Date/Time    URINE CULTURE(NEW) [[id number]]  (Abnormal)  (Susceptibility) Collected:  07/11/19 1652    Order Status:  Completed Specimen:  Urine Updated:  07/14/19 0851     Significant Indicator POS (POS)     Source UR     Site -     Culture Result - (A)      Escherichia coli  >100,000 cfu/mL   (A)    Culture & Susceptibility     ESCHERICHIA COLI     Antibiotic Sensitivity Microscan Unit Status    Ampicillin Sensitive <=8 mcg/mL Final    Method: ARNULFO    Cefepime Sensitive <=8 mcg/mL Final    Method: ARNULFO    Cefotaxime Sensitive <=2 mcg/mL Final    Method: ARNULFO    Cefotetan Sensitive <=16 mcg/mL Final    Method: ARNULFO    Ceftazidime Sensitive <=1 mcg/mL Final    Method: ARNULFO    Ceftriaxone Sensitive <=8 mcg/mL Final    Method: ARNULFO    Cefuroxime Sensitive <=4 mcg/mL Final    Method: ARNULFO    Cephalothin Sensitive <=8 mcg/mL Final    Method: ARNULFO    Ciprofloxacin Sensitive <=1 mcg/mL Final    Method: ARNULFO    Gentamicin Sensitive <=4 mcg/mL Final    Method: ARNULFO    Levofloxacin Sensitive <=2 mcg/mL Final    Method: ARNULFO    Nitrofurantoin Sensitive <=32 mcg/mL Final     Method: ARNULFO    Pip/Tazobactam Sensitive <=16 mcg/mL Final    Method: ARNULFO    Piperacillin Sensitive <=16 mcg/mL Final    Method: ARNULFO    Tigecycline Sensitive <=2 mcg/mL Final    Method: ARNULFO    Tobramycin Sensitive <=4 mcg/mL Final    Method: ARNULFO    Trimeth/Sulfa Sensitive <=2/38 mcg/mL Final    Method: ARNULFO                       URINALYSIS,CULTURE IF INDICATED [[id number]]  (Abnormal) Collected:  07/11/19 1652    Order Status:  Completed Specimen:  Urine Updated:  07/11/19 1722     Color Yellow     Character Cloudy (A)     Specific Gravity 1.025     Ph 6.0     Glucose Negative mg/dL      Ketones Negative mg/dL      Protein 100 (A) mg/dL      Bilirubin Negative     Nitrite Positive (A)     Leukocyte Esterase Moderate (A)     Occult Blood Large (A)     Micro Urine Req Microscopic          Plan:   Appropriate antibiotic therapy prescribed. No changes required based upon culture result.  Sent letter to patient to notify of positive culture result and encourage compliance with prescribed antibiotics.     Lucia Palacios

## 2019-08-29 ENCOUNTER — HOSPITAL ENCOUNTER (EMERGENCY)
Facility: MEDICAL CENTER | Age: 29
End: 2019-08-29
Attending: EMERGENCY MEDICINE
Payer: MEDICAID

## 2019-08-29 VITALS
HEART RATE: 64 BPM | WEIGHT: 115.52 LBS | OXYGEN SATURATION: 98 % | RESPIRATION RATE: 17 BRPM | DIASTOLIC BLOOD PRESSURE: 64 MMHG | BODY MASS INDEX: 19.25 KG/M2 | HEIGHT: 65 IN | TEMPERATURE: 98.1 F | SYSTOLIC BLOOD PRESSURE: 104 MMHG

## 2019-08-29 DIAGNOSIS — N12 PYELONEPHRITIS: ICD-10-CM

## 2019-08-29 LAB
ALBUMIN SERPL BCP-MCNC: 4.2 G/DL (ref 3.2–4.9)
ALBUMIN/GLOB SERPL: 1.8 G/DL
ALP SERPL-CCNC: 47 U/L (ref 30–99)
ALT SERPL-CCNC: 7 U/L (ref 2–50)
ANION GAP SERPL CALC-SCNC: 9 MMOL/L (ref 0–11.9)
APPEARANCE UR: ABNORMAL
AST SERPL-CCNC: 14 U/L (ref 12–45)
BACTERIA #/AREA URNS HPF: ABNORMAL /HPF
BASOPHILS # BLD AUTO: 0.4 % (ref 0–1.8)
BASOPHILS # BLD: 0.04 K/UL (ref 0–0.12)
BILIRUB SERPL-MCNC: 0.6 MG/DL (ref 0.1–1.5)
BILIRUB UR QL STRIP.AUTO: NEGATIVE
BUN SERPL-MCNC: 10 MG/DL (ref 8–22)
CALCIUM SERPL-MCNC: 9.1 MG/DL (ref 8.4–10.2)
CHLORIDE SERPL-SCNC: 101 MMOL/L (ref 96–112)
CO2 SERPL-SCNC: 30 MMOL/L (ref 20–33)
COLOR UR: ABNORMAL
CREAT SERPL-MCNC: 0.63 MG/DL (ref 0.5–1.4)
EOSINOPHIL # BLD AUTO: 0.06 K/UL (ref 0–0.51)
EOSINOPHIL NFR BLD: 0.5 % (ref 0–6.9)
EPI CELLS #/AREA URNS HPF: ABNORMAL /HPF
ERYTHROCYTE [DISTWIDTH] IN BLOOD BY AUTOMATED COUNT: 41.9 FL (ref 35.9–50)
GLOBULIN SER CALC-MCNC: 2.3 G/DL (ref 1.9–3.5)
GLUCOSE SERPL-MCNC: 86 MG/DL (ref 65–99)
GLUCOSE UR STRIP.AUTO-MCNC: NEGATIVE MG/DL
HCG UR QL: NEGATIVE
HCT VFR BLD AUTO: 40.1 % (ref 37–47)
HGB BLD-MCNC: 13.2 G/DL (ref 12–16)
IMM GRANULOCYTES # BLD AUTO: 0.02 K/UL (ref 0–0.11)
IMM GRANULOCYTES NFR BLD AUTO: 0.2 % (ref 0–0.9)
KETONES UR STRIP.AUTO-MCNC: NEGATIVE MG/DL
LEUKOCYTE ESTERASE UR QL STRIP.AUTO: ABNORMAL
LYMPHOCYTES # BLD AUTO: 1.95 K/UL (ref 1–4.8)
LYMPHOCYTES NFR BLD: 17.6 % (ref 22–41)
MCH RBC QN AUTO: 31.4 PG (ref 27–33)
MCHC RBC AUTO-ENTMCNC: 32.9 G/DL (ref 33.6–35)
MCV RBC AUTO: 95.5 FL (ref 81.4–97.8)
MICRO URNS: ABNORMAL
MONOCYTES # BLD AUTO: 0.82 K/UL (ref 0–0.85)
MONOCYTES NFR BLD AUTO: 7.4 % (ref 0–13.4)
NEUTROPHILS # BLD AUTO: 8.16 K/UL (ref 2–7.15)
NEUTROPHILS NFR BLD: 73.9 % (ref 44–72)
NITRITE UR QL STRIP.AUTO: POSITIVE
NRBC # BLD AUTO: 0 K/UL
NRBC BLD-RTO: 0 /100 WBC
PH UR STRIP.AUTO: 8 [PH] (ref 5–8)
PLATELET # BLD AUTO: 170 K/UL (ref 164–446)
PMV BLD AUTO: 10.4 FL (ref 9–12.9)
POTASSIUM SERPL-SCNC: 3.7 MMOL/L (ref 3.6–5.5)
PROT SERPL-MCNC: 6.5 G/DL (ref 6–8.2)
PROT UR QL STRIP: 100 MG/DL
RBC # BLD AUTO: 4.2 M/UL (ref 4.2–5.4)
RBC # URNS HPF: ABNORMAL /HPF
RBC UR QL AUTO: ABNORMAL
SODIUM SERPL-SCNC: 140 MMOL/L (ref 135–145)
SP GR UR REFRACTOMETRY: 1.01
WBC # BLD AUTO: 11.1 K/UL (ref 4.8–10.8)
WBC #/AREA URNS HPF: ABNORMAL /HPF

## 2019-08-29 PROCEDURE — 87077 CULTURE AEROBIC IDENTIFY: CPT

## 2019-08-29 PROCEDURE — 81001 URINALYSIS AUTO W/SCOPE: CPT

## 2019-08-29 PROCEDURE — 87186 SC STD MICRODIL/AGAR DIL: CPT

## 2019-08-29 PROCEDURE — 99284 EMERGENCY DEPT VISIT MOD MDM: CPT

## 2019-08-29 PROCEDURE — 700105 HCHG RX REV CODE 258: Performed by: EMERGENCY MEDICINE

## 2019-08-29 PROCEDURE — 700111 HCHG RX REV CODE 636 W/ 250 OVERRIDE (IP): Performed by: EMERGENCY MEDICINE

## 2019-08-29 PROCEDURE — 85025 COMPLETE CBC W/AUTO DIFF WBC: CPT

## 2019-08-29 PROCEDURE — 96375 TX/PRO/DX INJ NEW DRUG ADDON: CPT

## 2019-08-29 PROCEDURE — 96365 THER/PROPH/DIAG IV INF INIT: CPT

## 2019-08-29 PROCEDURE — 87086 URINE CULTURE/COLONY COUNT: CPT

## 2019-08-29 PROCEDURE — 81025 URINE PREGNANCY TEST: CPT

## 2019-08-29 PROCEDURE — 36415 COLL VENOUS BLD VENIPUNCTURE: CPT

## 2019-08-29 PROCEDURE — 80053 COMPREHEN METABOLIC PANEL: CPT

## 2019-08-29 RX ORDER — AMOXICILLIN AND CLAVULANATE POTASSIUM 875; 125 MG/1; MG/1
1 TABLET, FILM COATED ORAL 2 TIMES DAILY
Qty: 20 TAB | Refills: 0 | Status: SHIPPED | OUTPATIENT
Start: 2019-08-29 | End: 2019-11-20

## 2019-08-29 RX ORDER — IBUPROFEN 800 MG/1
800 TABLET ORAL EVERY 8 HOURS PRN
Qty: 30 TAB | Refills: 0 | Status: SHIPPED | OUTPATIENT
Start: 2019-08-29 | End: 2019-11-20

## 2019-08-29 RX ORDER — KETOROLAC TROMETHAMINE 30 MG/ML
30 INJECTION, SOLUTION INTRAMUSCULAR; INTRAVENOUS ONCE
Status: COMPLETED | OUTPATIENT
Start: 2019-08-29 | End: 2019-08-29

## 2019-08-29 RX ORDER — ACETAMINOPHEN 325 MG/1
650 TABLET ORAL EVERY 4 HOURS PRN
Status: SHIPPED | COMMUNITY
End: 2019-11-20

## 2019-08-29 RX ORDER — ONDANSETRON 4 MG/1
4 TABLET, ORALLY DISINTEGRATING ORAL ONCE
Qty: 10 TAB | Refills: 0 | Status: SHIPPED | OUTPATIENT
Start: 2019-08-29 | End: 2019-08-29

## 2019-08-29 RX ORDER — ONDANSETRON 2 MG/ML
4 INJECTION INTRAMUSCULAR; INTRAVENOUS ONCE
Status: COMPLETED | OUTPATIENT
Start: 2019-08-29 | End: 2019-08-29

## 2019-08-29 RX ADMIN — CEFTRIAXONE SODIUM 2 G: 2 INJECTION, POWDER, FOR SOLUTION INTRAMUSCULAR; INTRAVENOUS at 12:09

## 2019-08-29 RX ADMIN — KETOROLAC TROMETHAMINE 30 MG: 30 INJECTION, SOLUTION INTRAMUSCULAR at 12:06

## 2019-08-29 RX ADMIN — ONDANSETRON 4 MG: 2 INJECTION INTRAMUSCULAR; INTRAVENOUS at 12:06

## 2019-08-29 NOTE — ED NOTES
Medication Reconciliation updated and complete per pt at bedside  Allergies have been verified   No oral ABX within the last 14 days  Pt Home Pharmacy:[facility]

## 2019-08-29 NOTE — ED PROVIDER NOTES
ED Provider Note    ED Provider Note    Scribed for Tripp Peñaloza D.O. by Tripp Peñaloza. 8/29/2019  11:36 AM    Primary care provider: Shani Donovan M.D.  Means of arrival: Private vehicle  History obtained from: Patient  History limited by: None    CHIEF COMPLAINT  Chief Complaint   Patient presents with   • Blood in Urine     blood in urine started today  L sided back pain started yesterday       HPI  Lorenza Rodriguez is a 29 y.o. female who presents to the Emergency Department here for evaluation of left flank pain.  The patient states that she had left-sided back pain that started gradually yesterday, and today with some blood in her urine.  She has no abdominal pain, no fever no chills.  She has no radiation of the left flank pain, and she states that she has not taken anything prior to arrival to help with the pain or discomfort.  She states that she has an aching sensation in this left lower back.  She has no chest pain, no shortness of breath, no headache.  She states that she had a history of a kidney infection in the past.    REVIEW OF SYSTEMS  ROS   Kidney infection  Otherwise negative    PAST MEDICAL HISTORY   has a past medical history of Inguinal mass.    SURGICAL HISTORY   has a past surgical history that includes primary c section; repeat c section (6/5/2014); and mass excision general.    SOCIAL HISTORY  Social History     Tobacco Use   • Smoking status: Never Smoker   • Smokeless tobacco: Never Used   Substance Use Topics   • Alcohol use: Yes     Comment: few drinks per month   • Drug use: No      Social History     Substance and Sexual Activity   Drug Use No       FAMILY HISTORY  Family History   Problem Relation Age of Onset   • Other Mother         chron's   • Heart Disease Mother    • Hypertension Mother    • Hyperlipidemia Mother    • Stroke Mother    • Cancer Paternal Aunt         breast   • Diabetes Neg Hx        CURRENT MEDICATIONS  Home Medications    **Home medications have  "not yet been reviewed for this encounter**         ALLERGIES  Allergies   Allergen Reactions   • Nkda [No Known Drug Allergy]        PHYSICAL EXAM  VITAL SIGNS: /75   Pulse (!) 59   Temp 36.7 °C (98.1 °F) (Temporal)   Resp 16   Ht 1.651 m (5' 5\")   Wt 52.4 kg (115 lb 8.3 oz)   LMP 08/15/2019 (Approximate)   SpO2 100%   Breastfeeding? No   BMI 19.22 kg/m²     Constitutional: No distress. Well nourished.  Mild discomfort  HENT: Head is atraumatic. Oropharynx is moist.   Eyes: Conjunctivae are normal. EOMI.   Respiratory: No respiratory distress. Equal chest expansion.   Musculoskeletal: Normal range of motion. No edema.  Back; left CVA tenderness noted, no tenderness to right CVA.  Abdomen; soft, nontender, no guarding, no peritoneal signs.  Neurological: Alert. No focal deficits noted.    Skin: No rash. No Pallor.   Psych: Appropriate for clinical situation. Normal affect.    LABS  Results for orders placed or performed during the hospital encounter of 08/29/19   URINALYSIS CULTURE, IF INDICATED   Result Value Ref Range    Color Red     Character Hazy (A)     Ph 8.0 5.0 - 8.0    Glucose Negative Negative mg/dL    Ketones Negative Negative mg/dL    Protein 100 (A) Negative mg/dL    Bilirubin Negative Negative    Nitrite Positive (A) Negative    Leukocyte Esterase Large (A) Negative    Occult Blood Large (A) Negative    Micro Urine Req Microscopic    HCG QUALITATIVE UR   Result Value Ref Range    Beta-Hcg Urine Negative Negative   REFRACTOMETER SG   Result Value Ref Range    Specific Gravity 1.010    URINE MICROSCOPIC (W/UA)   Result Value Ref Range    WBC 20-50 (A) /hpf    RBC  (A) /hpf    Bacteria Many (A) None /hpf    Epithelial Cells Few Few /hpf   CBC WITH DIFFERENTIAL   Result Value Ref Range    WBC 11.1 (H) 4.8 - 10.8 K/uL    RBC 4.20 4.20 - 5.40 M/uL    Hemoglobin 13.2 12.0 - 16.0 g/dL    Hematocrit 40.1 37.0 - 47.0 %    MCV 95.5 81.4 - 97.8 fL    MCH 31.4 27.0 - 33.0 pg    MCHC 32.9 (L) " 33.6 - 35.0 g/dL    RDW 41.9 35.9 - 50.0 fL    Platelet Count 170 164 - 446 K/uL    MPV 10.4 9.0 - 12.9 fL    Neutrophils-Polys 73.90 (H) 44.00 - 72.00 %    Lymphocytes 17.60 (L) 22.00 - 41.00 %    Monocytes 7.40 0.00 - 13.40 %    Eosinophils 0.50 0.00 - 6.90 %    Basophils 0.40 0.00 - 1.80 %    Immature Granulocytes 0.20 0.00 - 0.90 %    Nucleated RBC 0.00 /100 WBC    Neutrophils (Absolute) 8.16 (H) 2.00 - 7.15 K/uL    Lymphs (Absolute) 1.95 1.00 - 4.80 K/uL    Monos (Absolute) 0.82 0.00 - 0.85 K/uL    Eos (Absolute) 0.06 0.00 - 0.51 K/uL    Baso (Absolute) 0.04 0.00 - 0.12 K/uL    Immature Granulocytes (abs) 0.02 0.00 - 0.11 K/uL    NRBC (Absolute) 0.00 K/uL   COMP METABOLIC PANEL   Result Value Ref Range    Sodium 140 135 - 145 mmol/L    Potassium 3.7 3.6 - 5.5 mmol/L    Chloride 101 96 - 112 mmol/L    Co2 30 20 - 33 mmol/L    Anion Gap 9.0 0.0 - 11.9    Glucose 86 65 - 99 mg/dL    Bun 10 8 - 22 mg/dL    Creatinine 0.63 0.50 - 1.40 mg/dL    Calcium 9.1 8.4 - 10.2 mg/dL    AST(SGOT) 14 12 - 45 U/L    ALT(SGPT) 7 2 - 50 U/L    Alkaline Phosphatase 47 30 - 99 U/L    Total Bilirubin 0.6 0.1 - 1.5 mg/dL    Albumin 4.2 3.2 - 4.9 g/dL    Total Protein 6.5 6.0 - 8.2 g/dL    Globulin 2.3 1.9 - 3.5 g/dL    A-G Ratio 1.8 g/dL   ESTIMATED GFR   Result Value Ref Range    GFR If African American >60 >60 mL/min/1.73 m 2    GFR If Non African American >60 >60 mL/min/1.73 m 2       All labs reviewed by me.    The radiologist's interpretation of all radiological studies have been reviewed by me.    COURSE & MEDICAL DECISION MAKING  Nursing notes, MIAH, PMSFHx reviewed in chart.    1:09 PM  The pt  Is nontoxic appearing, comfortable and afebrile.  She has no current pain, and has a history of recent kidney infection, just like her previous one last month.  The pt had a ct scan last time, that was negative for kidney stone.  She does not present as a kidney stone, and appears comfortable, so we will wait on a CT scan as well for this  visit.  I offered the patient admission here for IV antibiotics, and she had declined.  She states that she feels better at this time, and would like to go home.  We also discussed the likelihood of doing a CT scan, but she is very comfortable, has no pain, and had a gradual onset of her symptoms yesterday, that is the same presentation as her previous kidney infection.      FINAL IMPRESSION  1. Pyelonephritis         The note accurately reflects work and decisions made by me.  Tripp Peñaloza  8/29/2019  1:13 PM

## 2019-08-29 NOTE — LETTER
8/31/2019               Lorenza Rodriguez  [address] [address]        Dear Lorenza (MR#[id number])    This letter is sent in regards to your, recent visit to the [facility] Emergency Department on 8/29/2019.  During the visit, tests were performed to assist the physician in a medical diagnosis.  A review of those tests requires that we notify you of the following:    Your urine culture was POSITIVE for a bacteria called Escherichia coli. The antibiotic prescribed for you (amoxicillin/clavulanate) should be active to treat this bacteria. IT IS IMPORTANT THAT YOU CONTINUE TAKING YOUR ANTIBIOTIC UNTIL IT IS FINISHED.      Please feel free to contact me at the number below if you have any questions or concerns. Thank you for your cooperation in the matter.    Sincerely,  ED Culture Follow-Up Staff  Fausto Larkin, PharmD    [facility], Emergency Department  [facility] [place], [place] [address]  [phone] (ED Culture Line)  [phone]

## 2019-08-29 NOTE — ED NOTES
ERP at bedside. Pt agrees with plan of care discussed by ERP. AIDET acknowledged with patient. IV established. Blood sent to lab.Jean in low position, side rail up for pt safety. Call light within reach. Will continue to monitor.

## 2019-09-01 NOTE — ED NOTES
"ED Positive Culture Follow-up/Notification Note:    Date: 8/31/19     Patient seen in the ED on 8/29/2019 for hematuria and flank pain. Pt denied any fever and/or chills. Left sided CVA tenderness noted on physical exam.      1. Pyelonephritis       Discharge Medication List as of 8/29/2019  1:07 PM      START taking these medications    Details   amoxicillin-clavulanate (AUGMENTIN) 875-125 MG Tab Take 1 Tab by mouth 2 times a day., Disp-20 Tab, R-0, Print Rx Paper      ondansetron (ZOFRAN ODT) 4 MG TABLET DISPERSIBLE Take 1 Tab by mouth Once for 1 dose., Disp-10 Tab, R-0, Print Rx Paper      ibuprofen (MOTRIN) 800 MG Tab Take 1 Tab by mouth every 8 hours as needed., Disp-30 Tab, R-0, Print Rx Paper           Medications given in the ED:  -Ceftriaxone 2g IV once  -Ketorolac 30mg IV once  -Zofran 4mg IV once    Allergies: Patient has no known allergies.     Vitals:    08/29/19 1049 08/29/19 1314   BP: 125/75 104/64   Pulse: (!) 59 64   Resp: 16 17   Temp: 36.7 °C (98.1 °F)    TempSrc: Temporal    SpO2: 100% 98%   Weight: 52.4 kg (115 lb 8.3 oz)    Height: 1.651 m (5' 5\")        Final cultures:   Results     Procedure Component Value Units Date/Time    URINE CULTURE(NEW) [[id number]]  (Abnormal)  (Susceptibility) Collected:  08/29/19 1100    Order Status:  Completed Specimen:  Urine Updated:  08/31/19 0830     Significant Indicator POS     Source UR     Site -     Culture Result -      Escherichia coli  >100,000 cfu/mL      Susceptibility     Escherichia coli (1)     Antibiotic Interpretation Microscan Method Status    Ceftriaxone Sensitive <=8 mcg/mL ARNULFO Final    Ceftazidime Sensitive <=1 mcg/mL ARNULFO Final    Cephalothin Sensitive <=8 mcg/mL ARNULFO Final    Cefotaxime Sensitive <=2 mcg/mL ARNULFO Final    Ciprofloxacin Sensitive <=1 mcg/mL ARNULFO Final    Cefepime Sensitive <=8 mcg/mL ARNULFO Final    Cefuroxime Sensitive <=4 mcg/mL ARNULFO Final    Ampicillin Sensitive <=8 mcg/mL ARNULFO Final    Cefotetan Sensitive <=16 mcg/mL ARNULFO Final "    Tobramycin Sensitive <=4 mcg/mL ARNULFO Final    Nitrofurantoin Sensitive <=32 mcg/mL ARNULFO Final    Gentamicin Sensitive <=4 mcg/mL ARNULFO Final    Levofloxacin Sensitive <=2 mcg/mL ARNULFO Final    Pip/Tazobactam Sensitive <=16 mcg/mL ARNULFO Final    Piperacillin Sensitive <=16 mcg/mL ARNULFO Final    Trimeth/Sulfa Sensitive <=2/38 mcg/mL ARNULFO Final    Tigecycline Sensitive <=2 mcg/mL ARNULFO Final                   URINALYSIS CULTURE, IF INDICATED [[id number]]  (Abnormal) Collected:  08/29/19 1100    Order Status:  Completed Specimen:  Urine Updated:  08/29/19 1128     Color Red     Character Hazy     Ph 8.0     Glucose Negative mg/dL      Ketones Negative mg/dL      Protein 100 mg/dL      Bilirubin Negative     Nitrite Positive     Leukocyte Esterase Large     Occult Blood Large     Micro Urine Req Microscopic          Plan:   Appropriate antibiotic therapy prescribed. No changes required based upon culture result.  Sent letter to patient to notify of positive culture result and encourage compliance with prescribed antibiotics.       Fausto Larkin, PharmD

## 2019-11-20 ENCOUNTER — OFFICE VISIT (OUTPATIENT)
Dept: MEDICAL GROUP | Facility: MEDICAL CENTER | Age: 29
End: 2019-11-20
Attending: INTERNAL MEDICINE
Payer: MEDICAID

## 2019-11-20 VITALS
HEIGHT: 65 IN | HEART RATE: 70 BPM | RESPIRATION RATE: 16 BRPM | BODY MASS INDEX: 19.33 KG/M2 | DIASTOLIC BLOOD PRESSURE: 80 MMHG | WEIGHT: 116 LBS | TEMPERATURE: 98.3 F | OXYGEN SATURATION: 98 % | SYSTOLIC BLOOD PRESSURE: 122 MMHG

## 2019-11-20 DIAGNOSIS — Z30.09 COUNSELING FOR INITIATION OF BIRTH CONTROL METHOD: ICD-10-CM

## 2019-11-20 DIAGNOSIS — Z13.29 SCREENING FOR THYROID DISORDER: ICD-10-CM

## 2019-11-20 DIAGNOSIS — Z12.4 SCREENING FOR MALIGNANT NEOPLASM OF CERVIX: ICD-10-CM

## 2019-11-20 DIAGNOSIS — N89.8 VAGINAL DISCHARGE: ICD-10-CM

## 2019-11-20 PROBLEM — R19.04 ABDOMINAL MASS, LEFT LOWER QUADRANT: Status: RESOLVED | Noted: 2018-05-25 | Resolved: 2019-11-20

## 2019-11-20 LAB
INT CON NEG: NEGATIVE
INT CON POS: POSITIVE
POC URINE PREGNANCY TEST: NEGATIVE

## 2019-11-20 PROCEDURE — G0101 CA SCREEN;PELVIC/BREAST EXAM: HCPCS | Performed by: INTERNAL MEDICINE

## 2019-11-20 PROCEDURE — 99214 OFFICE O/P EST MOD 30 MIN: CPT | Mod: 25 | Performed by: INTERNAL MEDICINE

## 2019-11-20 PROCEDURE — 99213 OFFICE O/P EST LOW 20 MIN: CPT | Mod: 25 | Performed by: INTERNAL MEDICINE

## 2019-11-20 PROCEDURE — 81025 URINE PREGNANCY TEST: CPT | Performed by: INTERNAL MEDICINE

## 2019-11-20 RX ORDER — NORELGESTROMIN AND ETHINYL ESTRADIOL 35; 150 UG/MG; UG/MG
1 PATCH TRANSDERMAL
Qty: 4 PATCH | Refills: 5 | Status: SHIPPED | OUTPATIENT
Start: 2019-11-20 | End: 2019-12-10

## 2019-11-20 ASSESSMENT — PATIENT HEALTH QUESTIONNAIRE - PHQ9: CLINICAL INTERPRETATION OF PHQ2 SCORE: 0

## 2019-11-20 ASSESSMENT — PAIN SCALES - GENERAL: PAINLEVEL: NO PAIN

## 2019-11-20 NOTE — ASSESSMENT & PLAN NOTE
She reports several months of off-and-on white vaginal discharge.  She denies pelvic pain or itching.  She is currently sexually active as discussed below.  Her partner is asymptomatic.  She denies any genital rashes or lesions.

## 2019-11-20 NOTE — PROGRESS NOTES
Subjective:   Lorenza Rodriguez is a 29 y.o. female here today for PAP, birth control, weight management    Screening for malignant neoplasm of cervix  She presents today for Pap.  Her last was done 5 years ago.  She is currently sexually active with one male partner.  She reports some vaginal discharge as discussed above however she denies dyspareunia, dysuria, hematuria, pelvic pain, fevers, chills.  She is not sure on the results of her last Pap.  She believes she may have had an abnormal in the past but does not remember having a colposcopy or cervical biopsy before.    Vaginal discharge  She reports several months of off-and-on white vaginal discharge.  She denies pelvic pain or itching.  She is currently sexually active as discussed below.  Her partner is asymptomatic.  She denies any genital rashes or lesions.    Counseling for initiation of birth control method  Patient would like to start birth control.  She has been on the Ortho Evra patches in the past and reports these have worked very well for her without side effects.  She would like to start these.  Her LMP was approximately 3 ago and she has been sexually active since then.     Additionally, patient is concerned about her weight.  She states that prior to her pregnancy she was 140 pounds.  She last was this heavy approximately 5 years ago.  Her heaviest weight during her pregnancy was 126 pounds.  Since then she has been fairly stable around 115 to 117 pounds in our system.  She is just wondering if this is healthy.  Her BMI is still in the normal range.  She has not had to buy new close lately due to weight loss.  She eats 2 meals per day and does report a reduced appetite but she denies abdominal pain, heartburn, nausea vomiting.    Current medicines (including changes today)  Current Outpatient Medications   Medication Sig Dispense Refill   • norelgestromin-ethinyl estradiol (ORTHO EVRA) 150-35 MCG/24HR patch Apply 1 Patch to skin as directed  every 7 days. 4 Patch 5     No current facility-administered medications for this visit.      She  has a past medical history of Inguinal mass.    ROS   Denies chest pain, shortness of breath  As above in HPI     Objective:     Vitals:    11/20/19 1328   BP: 122/80   Pulse: 70   Resp: 16   Temp: 36.8 °C (98.3 °F)   SpO2: 98%     Body mass index is 19.3 kg/m².   Physical Exam:  Constitutional: Alert, no distress.  Skin: Warm, dry, good turgor, no rashes in visible areas.  Eye: Equal, round and reactive, conjunctiva clear, lids normal.  Psych: Alert and oriented x3, normal affect and mood.  : external genitalia normal, cervix with small amount of whitish discharge, no lesions, no cervical motion tenderness      Results and Imaging:   POC pregnancy in clinic today was negative    Assessment and Plan:   The following treatment plan was discussed    1. Counseling for initiation of birth control method  She will start the Ortho Evra after her next cycle finishes which should be approximately 1 week from now.  - norelgestromin-ethinyl estradiol (ORTHO EVRA) 150-35 MCG/24HR patch; Apply 1 Patch to skin as directed every 7 days.  Dispense: 4 Patch; Refill: 5  - POCT Pregnancy    2. Screening for thyroid disorder  Her weight has been relatively stable and reassurance was provided today that she is still within the normal BMI.  We talked about increasing her caloric intake and we will also check a TSH to make sure she is not hyperthyroid although low suspicion for this  - TSH WITH REFLEX TO FT4; Future    3. Screening for malignant neoplasm of cervix  - THINPREP PAP W/HPV AND CTNG; Future    4. Vaginal discharge  - VAGINAL PATHOGENS DNA PANEL; Future        Followup: Return if symptoms worsen or fail to improve.

## 2019-11-20 NOTE — ASSESSMENT & PLAN NOTE
She presents today for Pap.  Her last was done 5 years ago.  She is currently sexually active with one male partner.  She reports some vaginal discharge as discussed above however she denies dyspareunia, dysuria, hematuria, pelvic pain, fevers, chills.  She is not sure on the results of her last Pap.  She believes she may have had an abnormal in the past but does not remember having a colposcopy or cervical biopsy before.

## 2019-11-20 NOTE — ASSESSMENT & PLAN NOTE
Patient would like to start birth control.  She has been on the Ortho Evra patches in the past and reports these have worked very well for her without side effects.  She would like to start these.  Her LMP was approximately 3 ago and she has been sexually active since then.

## 2019-12-04 ENCOUNTER — TELEPHONE (OUTPATIENT)
Dept: MEDICAL GROUP | Facility: MEDICAL CENTER | Age: 29
End: 2019-12-04

## 2019-12-04 NOTE — TELEPHONE ENCOUNTER
Received fax from [facility]. Vaginal pathogens specimen not labeled. Unable to preform test.   Please see scanned in documentation.     Thank you

## 2019-12-06 ENCOUNTER — TELEPHONE (OUTPATIENT)
Dept: MEDICAL GROUP | Facility: MEDICAL CENTER | Age: 29
End: 2019-12-06

## 2019-12-06 NOTE — TELEPHONE ENCOUNTER
Phone Number Called: [phone] (home)     Call outcome: Left message for patient to call back @ [phone]

## 2019-12-06 NOTE — TELEPHONE ENCOUNTER
----- Message from Jenny Lambert M.D. sent at 11/27/2019 10:08 AM PST -----  Please mail normal PAP result.  She is negative for gonorrhea and chlamydia.  Repeat 3 years.  Jenny Lambert M.D.

## 2019-12-10 ENCOUNTER — OFFICE VISIT (OUTPATIENT)
Dept: MEDICAL GROUP | Facility: MEDICAL CENTER | Age: 29
End: 2019-12-10
Attending: INTERNAL MEDICINE
Payer: MEDICAID

## 2019-12-10 VITALS
HEIGHT: 65 IN | RESPIRATION RATE: 16 BRPM | WEIGHT: 116 LBS | SYSTOLIC BLOOD PRESSURE: 128 MMHG | DIASTOLIC BLOOD PRESSURE: 82 MMHG | OXYGEN SATURATION: 98 % | HEART RATE: 82 BPM | BODY MASS INDEX: 19.33 KG/M2 | TEMPERATURE: 98.1 F

## 2019-12-10 DIAGNOSIS — R82.90 FOUL SMELLING URINE: ICD-10-CM

## 2019-12-10 DIAGNOSIS — Z3A.01 LESS THAN 8 WEEKS GESTATION OF PREGNANCY: ICD-10-CM

## 2019-12-10 PROBLEM — Z12.4 SCREENING FOR MALIGNANT NEOPLASM OF CERVIX: Status: RESOLVED | Noted: 2019-11-20 | Resolved: 2019-12-10

## 2019-12-10 PROBLEM — N89.8 VAGINAL DISCHARGE: Status: RESOLVED | Noted: 2019-11-20 | Resolved: 2019-12-10

## 2019-12-10 PROBLEM — Z30.09 COUNSELING FOR INITIATION OF BIRTH CONTROL METHOD: Status: RESOLVED | Noted: 2018-05-25 | Resolved: 2019-12-10

## 2019-12-10 LAB
APPEARANCE UR: CLEAR
BILIRUB UR STRIP-MCNC: NEGATIVE MG/DL
COLOR UR AUTO: YELLOW
GLUCOSE UR STRIP.AUTO-MCNC: NEGATIVE MG/DL
INT CON NEG: NEGATIVE
INT CON POS: POSITIVE
KETONES UR STRIP.AUTO-MCNC: NEGATIVE MG/DL
LEUKOCYTE ESTERASE UR QL STRIP.AUTO: NEGATIVE
NITRITE UR QL STRIP.AUTO: NEGATIVE
PH UR STRIP.AUTO: 7 [PH] (ref 5–8)
POC URINE PREGNANCY TEST: POSITIVE
PROT UR QL STRIP: NEGATIVE MG/DL
RBC UR QL AUTO: NEGATIVE
SP GR UR STRIP.AUTO: 1.01
UROBILINOGEN UR STRIP-MCNC: 0.2 MG/DL

## 2019-12-10 PROCEDURE — 81002 URINALYSIS NONAUTO W/O SCOPE: CPT | Performed by: INTERNAL MEDICINE

## 2019-12-10 PROCEDURE — 99213 OFFICE O/P EST LOW 20 MIN: CPT | Performed by: INTERNAL MEDICINE

## 2019-12-10 PROCEDURE — 99214 OFFICE O/P EST MOD 30 MIN: CPT | Performed by: INTERNAL MEDICINE

## 2019-12-10 PROCEDURE — 81025 URINE PREGNANCY TEST: CPT | Performed by: INTERNAL MEDICINE

## 2019-12-10 RX ORDER — PNV NO.95/FERROUS FUM/FOLIC AC 28MG-0.8MG
1 TABLET ORAL DAILY
Qty: 30 TAB | Refills: 11 | Status: SHIPPED | OUTPATIENT
Start: 2019-12-10

## 2019-12-10 ASSESSMENT — PAIN SCALES - GENERAL: PAINLEVEL: NO PAIN

## 2019-12-10 NOTE — ASSESSMENT & PLAN NOTE
She reports that her LMP was near the end of October, sometime around [facility].  At her last visit we had prescribed her Ortho Evra patches and she was waiting until her menses to start them however her cycle never came.  States she has taken multiple pregnancy tests at home which have been positive.  She also reports some low-grade nausea and breast tenderness.  She has not started a prenatal vitamin yet.  She does not have an established OB provider.

## 2019-12-10 NOTE — LETTER
December 10, 2019        Lorenza Rodriguez  [address]  [place] [address]        To whom it may concern:    Lorenza Rodriguez is currently a patient under my care at the [facility].  She is currently pregnant with a positive test in clinic today.  Estimated gestational age by LMP is about 6 weeks.    If you have any questions or concerns, please don't hesitate to call.        Sincerely,        Jenny Lambert M.D.    Electronically Signed

## 2019-12-10 NOTE — PROGRESS NOTES
Subjective:   Lorenza Rodriguez is a 29 y.o. female here today for pregnancy confirmation, concern for UTI    Less than 8 weeks gestation of pregnancy  She reports that her LMP was near the end of October, sometime around [facility].  At her last visit we had prescribed her Ortho Evra patches and she was waiting until her menses to start them however her cycle never came.  States she has taken multiple pregnancy tests at home which have been positive.  She also reports some low-grade nausea and breast tenderness.  She has not started a prenatal vitamin yet.  She does not have an established OB provider.    Foul smelling urine  She reports her urine has been more cloudy and foul-smelling lately.  She is also had some back pain.  She denies fevers and chills, pelvic pain, hematuria, dysuria.  She is concerned for UTI.       Current medicines (including changes today)  Current Outpatient Medications   Medication Sig Dispense Refill   • Prenatal Vit-Fe Fumarate-FA (PRENATAL VITAMIN) 27-0.8 MG Tab Take 1 Tab by mouth every day. 30 Tab 11     No current facility-administered medications for this visit.      She  has a past medical history of Inguinal mass.    ROS   Denies chest pain, shortness of breath  As above in HPI     Objective:     Vitals:    12/10/19 1123   BP: 128/82   Pulse: 82   Resp: 16   Temp: 36.7 °C (98.1 °F)   SpO2: 98%     Body mass index is 19.3 kg/m².   Physical Exam:  Constitutional: Alert, no distress.  Skin: Warm, dry, good turgor, no rashes in visible areas.  Eye: Equal, round and reactive, conjunctiva clear, lids normal.  Psych: Alert and oriented x3, normal affect and mood.      Results and Imaging:   POCT pregnancy test in clinic today was positive  POC urinalysis in clinic today was completely normal    Assessment and Plan:   The following treatment plan was discussed    1. Less than 8 weeks gestation of pregnancy  She is currently pregnant.  Although we do not know her exact LMP, if we  estimate 10/31 she is about 6 weeks.  I have written her a letter for the pregnancy center so that she can establish in the next 2 to 4 weeks with a provider.  She will start a prenatal vitamin.  - Prenatal Vit-Fe Fumarate-FA (PRENATAL VITAMIN) 27-0.8 MG Tab; Take 1 Tab by mouth every day.  Dispense: 30 Tab; Refill: 11  - POCT Pregnancy    2. Foul smelling urine  Suspect secondary to dehydration.  Completely normal UA without evidence of UTI.  Reassurance provided.  - POCT Urinalysis        Followup: Return if symptoms worsen or fail to improve.

## 2019-12-10 NOTE — ASSESSMENT & PLAN NOTE
She reports her urine has been more cloudy and foul-smelling lately.  She is also had some back pain.  She denies fevers and chills, pelvic pain, hematuria, dysuria.  She is concerned for UTI.

## 2019-12-20 ENCOUNTER — TELEPHONE (OUTPATIENT)
Dept: MEDICAL GROUP | Facility: MEDICAL CENTER | Age: 29
End: 2019-12-20

## 2019-12-20 NOTE — TELEPHONE ENCOUNTER
1. Caller Name: Lorenza                    Call Back Number: [phone] (home)       Patient approves a detailed voicemail message: yes    Pt left message stating she has been having severe nausea and is asking if there is anything that she could take to help until she see OB. please advise

## 2019-12-20 NOTE — TELEPHONE ENCOUNTER
Attempted contact with Pt. Phone number in chart is not in service at this time. Was advised Number provided on voice message from Pt is the wrong number.

## 2019-12-23 NOTE — TELEPHONE ENCOUNTER
Contacted pt on the [phone] and she is still having nausea, she has purchased an otc remedy and will see if that helps. Advised pt to keep small snacks such as saltine crackers with her and to call us if she does not feel better.

## 2020-10-10 ENCOUNTER — APPOINTMENT (OUTPATIENT)
Dept: RADIOLOGY | Facility: MEDICAL CENTER | Age: 30
End: 2020-10-10
Attending: OBSTETRICS & GYNECOLOGY
Payer: MEDICAID

## 2020-10-10 ENCOUNTER — HOSPITAL ENCOUNTER (EMERGENCY)
Facility: MEDICAL CENTER | Age: 30
End: 2020-10-10
Payer: MEDICAID

## 2020-10-10 ENCOUNTER — HOSPITAL ENCOUNTER (EMERGENCY)
Facility: MEDICAL CENTER | Age: 30
End: 2020-10-10
Attending: OBSTETRICS & GYNECOLOGY | Admitting: OBSTETRICS & GYNECOLOGY
Payer: MEDICAID

## 2020-10-10 VITALS
DIASTOLIC BLOOD PRESSURE: 81 MMHG | BODY MASS INDEX: 22.16 KG/M2 | RESPIRATION RATE: 16 BRPM | SYSTOLIC BLOOD PRESSURE: 136 MMHG | WEIGHT: 133 LBS | OXYGEN SATURATION: 98 % | HEART RATE: 77 BPM | HEIGHT: 65 IN | TEMPERATURE: 98.1 F

## 2020-10-10 PROCEDURE — 76815 OB US LIMITED FETUS(S): CPT

## 2020-10-10 PROCEDURE — 59025 FETAL NON-STRESS TEST: CPT

## 2020-10-10 PROCEDURE — 96372 THER/PROPH/DIAG INJ SC/IM: CPT

## 2020-10-10 PROCEDURE — 99284 EMERGENCY DEPT VISIT MOD MDM: CPT

## 2020-10-10 PROCEDURE — 302449 STATCHG TRIAGE ONLY (STATISTIC)

## 2020-10-10 PROCEDURE — 700111 HCHG RX REV CODE 636 W/ 250 OVERRIDE (IP): Performed by: OBSTETRICS & GYNECOLOGY

## 2020-10-10 RX ORDER — TERBUTALINE SULFATE 1 MG/ML
0.25 INJECTION, SOLUTION SUBCUTANEOUS ONCE
Status: COMPLETED | OUTPATIENT
Start: 2020-10-10 | End: 2020-10-10

## 2020-10-10 RX ADMIN — TERBUTALINE SULFATE 0.25 MG: 1 INJECTION, SOLUTION SUBCUTANEOUS at 22:31

## 2020-10-10 ASSESSMENT — FIBROSIS 4 INDEX: FIB4 SCORE: 0.96

## 2020-10-11 NOTE — PROGRESS NOTES
FAITH 2020  GA 31w3d  SIUP    Arrived from [facility] ER c/o vaginal bleeding after intercourse about an hour ago. . Reports +Fm and cramping. No gushes of fluid. Pt see [facility] as well and is on progesterone. Unable to find any prenatal or US hx. On visual inspection. Perineum wet with blood but not actively coming out.     - Dr. Song will come to bedside. US ordered.   - US at bedside.   - Dr. Song at bedside. SVE 1cm. Orders to watch pt for one hour. See if UCs space out. Minimal blood noted on MD glove. Will watch for now.,   - call to Dr. Song still ke. Non painful. Orders for Terbutaline    - call to Dr. Song. Pt ok to be discharged.,   0- pre term labor precautions discussed. All questions answered. Pt ambulatory off unit with FOB with all personal belongings.

## 2020-10-11 NOTE — H&P
[address] [address]    Phone:  [phone]       Thank You for choosing us for your health care visit. We are glad to serve you and happy to provide you with this summary of your visit. Please help us to ensure we have accurate records. If you find anything that needs to be changed, please let our staff know as soon as possible.          Your Demographic Information     Patient Name Sex Rowan Huddleston Female 1940       Ethnic Group Patient Race    Not of  or  Origin White      Your Visit Details     Date & Time Provider Department    2017 8:20 AM Kevin Mayberry, YASMIN [facility]      Your Upcoming Appointment*(Max 10)     2017  2:15 PM CST   Post-Op Visit with Dejon Del Valle MD   [facility] ([facility])    [address] [place] [id number] [phone]              1:00 PM CDT   Post-Op Visit with Kevin Mayberry OD   [facility] ([facility])    [address] [place] [id number] [phone]            Tuesday August 15, 2017  8:30 AM CDT   [facility] Addison [facility]   [facility]    [address] [address]   [phone]              Conditions Discussed Today or Order-Related Diagnoses        Comments    Pseudophakia of both eyes    -  Primary       Your Vitals Were     Smoking Status                   Former Smoker           Medications Prescribed or Re-Ordered Today     None      Your Current Medications Are        Disp Refills Start End    loratadine (CLARITIN) 10 MG tablet        Sig - Route: Take 10 mg by mouth daily as needed for Allergies. - Oral    Class: Historical Med    nicotinic acid (NIACIN) 500 MG tablet        Sig - Route: Take 1,500 mg by mouth  History and physical     October 10, 2020    Chief complaint:   Patient presents with bleeding    History of present illness: Patient is a 30-year-old female para 2 who currently is at 31 3/7 weeks based on a due date of  consistent with a 7-week ultrasound.    Patient was having intercourse and had bleeding during intercourse and afterwards she presented for evaluation.  Currently the bleeding is light and is pretty much stopped.  They had intercourse about 2 hours ago.    She does have a history of deliveries at 26 and 32 weeks and has had 2 previous  sections.  She is having some occasional contractions they were more frequent on admission.  Recently started up again terbutaline was ordered.    She has been followed for short cervix.  Also some suspected fetal congenital heart anomalies in the baby such as two to leaflet aortic valve, post valvular dilation of the left ventricular outflow track, followed by high risk pregnancy center.    Medical history: Benign    Surgical history: Inguinal hernia repair, classical  and low transverse     Habits: Patient denies tobacco alcohol or drug use    Allergies: No known drug allergies    Medications: None    Obstetric history: Patient is Rh+,      Family history: NoncontributoryTo current problem    Physical exam  Vitals: Normal  General: Patient is awake alert pleasant  Head: Atraumatic normocephalic  Lungs: Clear to auscultation  Heart: Regular rate and rhythm without murmurs  Abdomen gravid:  Pelvic: 1 cm 50% vertex -3  Extremities: Normal    Assessment:  1-third trimester bleeding after intercourse  2-2 previous  sections  3-history of  deliveries      Plan:  Since the terbutaline shot patient been quiet.  It appears her bleeding has been just since intercourse.  On exam there is a very light amount of blood that was old.    Presented showed a cervical length of 1.77 cm.  Amniotic fluid just over 8 cm.  Growth was  appropriate 31- weeks.  Estimated fetal weight was 1575 g  Presentation Vertex.    He was discharged home.   labor precautions were reviewed.   nightly. - Oral    Class: Historical Med    oxybutynin (DITROPAN) 5 MG tablet 180 tablet 3 2/15/2017     Sig - Route: Take 1 tablet by mouth 2 times daily. - Oral    Class: Eprescribe    benazepril (LOTENSIN) 40 MG tablet 90 tablet 3 2/15/2017     Sig - Route: Take 1 tablet by mouth daily. - Oral    Class: Eprescribe    naproxen (NAPROSYN) 500 MG tablet 180 tablet 3 2/15/2017     Sig - Route: Take 1 tablet by mouth 2 times daily (with meals). - Oral    Class: Eprescribe    buPROPion (WELLBUTRIN XL) 150 MG 24 hr tablet 1 tablet 0 2/15/2017     Sig: Cancel refills: plan to stop    Class: Eprescribe    levothyroxine (SYNTHROID, LEVOTHROID) 100 MCG tablet 90 tablet 3 2/15/2017     Sig - Route: Take 1 tablet by mouth daily. - Oral    Class: Eprescribe    ciprofloxacin (CIPRO) 0.3 % ophthalmic solution 5 mL 4 2016     Sig: Use 1 drop in operative eye four times daily starting on 16. After surgery 1 drop qid for 1 week then stop.    Class: Eprescribe    ketorolac (ACULAR) 0.5 % ophthalmic solution 5 mL 4 2016     Sig: Place 1 drop to operative eye four times daily starting on 16. After surgery, use as directed for the next 4 weeks.    Class: Eprescribe    prednisoLONE acetate (PRED FORTE, OMNIPRED) 1 % ophthalmic suspension 5 mL 4 2016     Sig: Place 1 drop in operative eye four times daily for 1 week after surgery and taper as directed    Class: Eprescribe    fluticasone (FLOVENT HFA) 220 MCG/ACT inhaler 1 Inhaler 5 2016     Sig - Route: Inhale 1 puff into the lungs 2 times daily. To begin 2016 - Inhalation    Class: Eprescribe    Notes to Pharmacy: Keep on file    hydrochlorothiazide (HYDRODIURIL) 50 MG tablet 100 tablet 1 2016     Sig: Take 1 tablet by mouth daily.    Class: Eprescribe    Notes to Pharmacy: Keep on file    albuterol-ipratropium (COMBIVENT RESPIMAT)  MCG/ACT inhaler 1 Inhaler 5 2016     Si puff by mouth 4 times per day    Class: Eprescribe     Notes to Pharmacy: Keep on file    Multiple Vitamins-Minerals (CENTRUM SILVER PO)        Sig - Route: Take 1 tablet by mouth daily.  - Oral    Class: Historical Med    aspirin (ECOTRIN) 81 MG EC tablet 1 tablet 0 2014     Sig - Route: Take 1 tablet by mouth daily. For stroke and heart attack prevention - Oral    Class: OTC    GLUCOSAMINE CHONDROITIN COMPLX PO CAPS 0 0 1/15/2008 2017    Si twice daily for arthritis    Class: OTC    CALCIUM 600 MG PO TABS 0 0 1/15/2008 2017    Sig - Route: with vit. D:  one tab 2 times daily for bone health - Oral    Class: OTC      Allergies     No Known Allergies      Immunizations History as of 2017     Name Date    HERPES ZOSTER SHINGLES 10/1/2014    Influenza 2016, 2015, 10/1/2014, 10/15/2013, 10/29/2012 11:34 AM, 2011 10:16 AM, 2010  9:29 AM, 2008 11:05 AM, 2007, 10/20/2006, 2005, 10/25/2004    Influenza A novel H1N1 3/2/2010 10:37 AM    Pneumococcal Conjugate 13 Valent 1/15/2016    Pneumococcal Polysaccharide Adult 2004    Td:Adult type tetanus/diphtheria 2004    Tdap 2016      Problem List as of 2017     Hypertension goal BP (blood pressure) < 140/90    Unspecified hypothyroidism    basal cell left eyebrow    Obesity, unspecified    HYPERLIPIDEMIA goal <130     Unspecified asthma(493.90)    Dysmetabolic syndrome X    MILD SLEEP APNEA and SNORING    Osteoarthrosis involving, or with mention of more than one site, but not specified as generalized, site unspecified    osteopenia    Absence of bladder continence    Anxiety    Cataract      Patient Portal Signup     Manage health care for you and your family anytime, anywhere with the new [facility], your free online resource for quick and easy access to personal health information, scheduling appointments, refilling prescriptions, viewing test results, paying bills and more.  Sign up for a free and secure account. Please follow the instructions below  to securely complete your enrollment.     1. Go to [email or web address]  2. Click Sign Up Now   3. Enter the Activation Code when prompted     Activation Code: [email or web address]  Expires: 3/24/2017  6:30 AM    If you have questions related to [facility], you can email [email or web address] or call [phone] to talk to our [facility] staff.  For questions related to your health, contact your physician’s office.  Please remember to dial 911 for medical emergencies.              Patient Instructions     None

## 2020-10-11 NOTE — ED PROVIDER NOTES
7:30 PM Noting that this patient was 39 weeks pregnant came in complaining of vaginal bleeding and pelvic pressure, I briefly assessed her at triage.  She denies profuse hemorrhage, and appears generally well.    I explained that I would be happy to see her here, to obtain an ultrasound and do a more thorough assessment, but that regardless of our findings here, I would still recommend that she go to the [facility] hospital for labor and delivery evaluation.  Given that information, the patient has chosen to go directly to the [facility] hospital for labor and delivery evaluation.  We explained that she should go in through the emergency department entrance and will be triaged appropriately.  I think this is the most appropriate and expedient plan of care, given that she does not show any signs of hemorrhage, instability, or impending delivery.

## 2020-11-09 ENCOUNTER — APPOINTMENT (OUTPATIENT)
Dept: OBGYN | Facility: MEDICAL CENTER | Age: 30
End: 2020-11-09
Attending: OBSTETRICS & GYNECOLOGY
Payer: MEDICAID

## 2020-11-09 ENCOUNTER — HOSPITAL ENCOUNTER (EMERGENCY)
Facility: MEDICAL CENTER | Age: 30
End: 2020-11-09
Attending: OBSTETRICS & GYNECOLOGY | Admitting: OBSTETRICS & GYNECOLOGY
Payer: MEDICAID

## 2020-11-09 VITALS
WEIGHT: 138 LBS | HEIGHT: 66 IN | TEMPERATURE: 97.9 F | OXYGEN SATURATION: 97 % | HEART RATE: 69 BPM | BODY MASS INDEX: 22.18 KG/M2 | SYSTOLIC BLOOD PRESSURE: 119 MMHG | DIASTOLIC BLOOD PRESSURE: 66 MMHG | RESPIRATION RATE: 18 BRPM

## 2020-11-09 LAB
ALBUMIN SERPL BCP-MCNC: 3.1 G/DL (ref 3.2–4.9)
ALBUMIN/GLOB SERPL: 1.2 G/DL
ALP SERPL-CCNC: 139 U/L (ref 30–99)
ALT SERPL-CCNC: 31 U/L (ref 2–50)
ANION GAP SERPL CALC-SCNC: 6 MMOL/L (ref 7–16)
AST SERPL-CCNC: 30 U/L (ref 12–45)
BASOPHILS # BLD AUTO: 0.3 % (ref 0–1.8)
BASOPHILS # BLD: 0.04 K/UL (ref 0–0.12)
BILIRUB SERPL-MCNC: 0.3 MG/DL (ref 0.1–1.5)
BUN SERPL-MCNC: 7 MG/DL (ref 8–22)
CALCIUM SERPL-MCNC: 8.8 MG/DL (ref 8.5–10.5)
CHLORIDE SERPL-SCNC: 104 MMOL/L (ref 96–112)
CO2 SERPL-SCNC: 24 MMOL/L (ref 20–33)
COVID ORDER STATUS COVID19: NORMAL
CREAT SERPL-MCNC: 0.35 MG/DL (ref 0.5–1.4)
CREAT UR-MCNC: 49.59 MG/DL
EOSINOPHIL # BLD AUTO: 0.06 K/UL (ref 0–0.51)
EOSINOPHIL NFR BLD: 0.5 % (ref 0–6.9)
ERYTHROCYTE [DISTWIDTH] IN BLOOD BY AUTOMATED COUNT: 42.5 FL (ref 35.9–50)
GLOBULIN SER CALC-MCNC: 2.5 G/DL (ref 1.9–3.5)
GLUCOSE SERPL-MCNC: 88 MG/DL (ref 65–99)
HCT VFR BLD AUTO: 34.2 % (ref 37–47)
HGB BLD-MCNC: 11.7 G/DL (ref 12–16)
IMM GRANULOCYTES # BLD AUTO: 0.25 K/UL (ref 0–0.11)
IMM GRANULOCYTES NFR BLD AUTO: 1.9 % (ref 0–0.9)
LYMPHOCYTES # BLD AUTO: 2.44 K/UL (ref 1–4.8)
LYMPHOCYTES NFR BLD: 18.5 % (ref 22–41)
MCH RBC QN AUTO: 32.3 PG (ref 27–33)
MCHC RBC AUTO-ENTMCNC: 34.2 G/DL (ref 33.6–35)
MCV RBC AUTO: 94.5 FL (ref 81.4–97.8)
MONOCYTES # BLD AUTO: 1.14 K/UL (ref 0–0.85)
MONOCYTES NFR BLD AUTO: 8.6 % (ref 0–13.4)
NEUTROPHILS # BLD AUTO: 9.26 K/UL (ref 2–7.15)
NEUTROPHILS NFR BLD: 70.2 % (ref 44–72)
NRBC # BLD AUTO: 0.06 K/UL
NRBC BLD-RTO: 0.5 /100 WBC
PLATELET # BLD AUTO: 190 K/UL (ref 164–446)
PMV BLD AUTO: 10.6 FL (ref 9–12.9)
POTASSIUM SERPL-SCNC: 3.6 MMOL/L (ref 3.6–5.5)
PROT SERPL-MCNC: 5.6 G/DL (ref 6–8.2)
PROT UR-MCNC: 11 MG/DL (ref 0–15)
PROT/CREAT UR: 222 MG/G (ref 10–107)
RBC # BLD AUTO: 3.62 M/UL (ref 4.2–5.4)
SARS-COV-2 RNA RESP QL NAA+PROBE: NOTDETECTED
SODIUM SERPL-SCNC: 134 MMOL/L (ref 135–145)
SPECIMEN SOURCE: NORMAL
URATE SERPL-MCNC: 3.8 MG/DL (ref 1.9–8.2)
WBC # BLD AUTO: 13.2 K/UL (ref 4.8–10.8)

## 2020-11-09 PROCEDURE — 82570 ASSAY OF URINE CREATININE: CPT

## 2020-11-09 PROCEDURE — 84156 ASSAY OF PROTEIN URINE: CPT

## 2020-11-09 PROCEDURE — 84550 ASSAY OF BLOOD/URIC ACID: CPT

## 2020-11-09 PROCEDURE — C9803 HOPD COVID-19 SPEC COLLECT: HCPCS | Performed by: OBSTETRICS & GYNECOLOGY

## 2020-11-09 PROCEDURE — 36415 COLL VENOUS BLD VENIPUNCTURE: CPT

## 2020-11-09 PROCEDURE — 85025 COMPLETE CBC W/AUTO DIFF WBC: CPT

## 2020-11-09 PROCEDURE — 80053 COMPREHEN METABOLIC PANEL: CPT

## 2020-11-09 PROCEDURE — 99282 EMERGENCY DEPT VISIT SF MDM: CPT

## 2020-11-09 PROCEDURE — U0003 INFECTIOUS AGENT DETECTION BY NUCLEIC ACID (DNA OR RNA); SEVERE ACUTE RESPIRATORY SYNDROME CORONAVIRUS 2 (SARS-COV-2) (CORONAVIRUS DISEASE [COVID-19]), AMPLIFIED PROBE TECHNIQUE, MAKING USE OF HIGH THROUGHPUT TECHNOLOGIES AS DESCRIBED BY CMS-2020-01-R: HCPCS

## 2020-11-09 PROCEDURE — 59025 FETAL NON-STRESS TEST: CPT

## 2020-11-09 ASSESSMENT — PAIN SCALES - GENERAL: PAINLEVEL: 0 - NO PAIN

## 2020-11-09 ASSESSMENT — FIBROSIS 4 INDEX: FIB4 SCORE: 0.96

## 2020-11-09 NOTE — PROGRESS NOTES
EDC - 2020 EGA - 35.5    1200 - Pt arrived to labor and delivery for PIH workup from the office and Covid swab. Pt placed in room LDA 6. External monitors in place X2. Category I FHT at this time. VSS. Pt had high b/p in office. Pt denies HA, visual changes, epigastric pain and swelling. Pt reports good FM. No complaints of contractions, ROM or vaginal bleeding. POC discussed with pt all questions answered.   1215 Pt is scheduled for a  on , Covid swab obtained.    1400- Dr. Jean-Baptiste called and notified of patient status and lab values.  Still awaiting protein/creatine ratio at this time.   Dr. Jean-Baptiste reviewed tracing.  Order received to discharge patient home.    1403 Discharge instructions given including PTL precautions and PIH education and when to return to the hospital, Pt verbalizes understanding at this time. All questions answered. Self isolation instructions given.     1405 Pt ambulated off the unit with personal belongings in place.

## 2020-11-10 NOTE — CONSULTS
DATE OF SERVICE:  2020    HISTORY OF PRESENT ILLNESS:  Patient is a private patient of Dr. Mary Lou Broderick.    She 30-year-old  4, para 2, EDC of , makers her 35 and 5/7th   weeks' gestation, presents to labor and delivery after being sent from the   office to rule out preeclampsia.  Patient was seen and evaluated in the office   today and had an elevated blood pressure of 140/90.  She has no other signs   or symptoms of preeclampsia, but that was the first time she had ever had   elevated blood pressure.  She has a previous classical  section and is   for repeat later this week secondary to her history of having a classical   .  Upon arrival, she has perfectly normal blood pressures, they are   highest was 132/77, but as low as 119/66.  She is afebrile.  Again, she has no   signs or symptoms of preeclampsia.  Denies headache, blurry vision, or right   upper quadrant pain.  Fetal nonstress test was category 1, reactive,   reassuring, good variability.  She had 1 small variable nonrepetitive and some   rare contractions seen on the monitor.  Baby was overall reassuring.  Her   preeclampsia labs returned showing a platelet count of 190,000, hemoglobin   11.7, hematocrit 34.2.  Her creatinine was 0.35, AST was 30, ALT 31, uric acid   was 3.8 and I am just awaiting her protein creatinine ratio.  It has been   pending for quite some time now.  Patient is getting distressed as she needs   to pick her children up from school and due to category 1 tracing and   reassuring all normal blood pressures and essentially the rest of her labs   being normal even if the protein creatinine ratio was 300 or more, it would   not change the management at this time.  So, I want to let her go home, I will   look it up in the next hour to see if it has returned.  She again has her   repeat  section later this week.  COVID swab was obtained for her   preoperative COVID swab before she left, but of  course is still pending.  The   patient will be discharged home.  Kick counts, labor warnings given, as well   as preeclampsia warnings.      Pro/cr ratio was 222       ____________________________________     MD HALIMA MAYA / CLIFF    DD:  11/09/2020 14:08:40  DT:  11/09/2020 17:33:44    D#:  [id number]  Job#:  [id number]

## 2020-11-11 ENCOUNTER — PRE-ADMISSION TESTING (OUTPATIENT)
Dept: ADMISSIONS | Facility: MEDICAL CENTER | Age: 30
End: 2020-11-11
Attending: OBSTETRICS & GYNECOLOGY
Payer: MEDICAID

## 2020-11-11 RX ORDER — SODIUM CHLORIDE, SODIUM LACTATE, POTASSIUM CHLORIDE, CALCIUM CHLORIDE 600; 310; 30; 20 MG/100ML; MG/100ML; MG/100ML; MG/100ML
INJECTION, SOLUTION INTRAVENOUS CONTINUOUS
Status: CANCELLED | OUTPATIENT
Start: 2020-11-12

## 2020-11-11 NOTE — H&P
DATE OF ADMISSION:  10/12/2020    IDENTIFICATION:  A 30-year-old  5, para 0-2-2-2, at 36 weeks and 1 day   by last menstrual period, EDC 2020.    HISTORY OF PRESENT ILLNESS:  The patient has prenatal care with myself.  Her   pregnancy has been complicated by history of  section x2.  She also   has a history of  birth with both of her prior pregnancies.  She has   been seen by [facility] and been on vaginal progesterone   throughout her pregnancy.  In her first pregnancy, she delivered at 27 weeks   via classical .  Her second pregnancy was via repeat .  The   patient desires repeat  section.  She has declined permanent   sterilization.  Her pregnancy has otherwise been uncomplicated.  The fetus   does have post-valvular dilation of the ascending aorta.  The fetus has   undergone a fetal echo at the [facility] and the patient has been   recommended to have the infant undergo post- echocardiogram.  EFW on    was 5 pounds 15 ounces, 61 percentile.  The patient did develop elevated   blood pressures this week; however, her preeclampsia evaluation has been   negative.  She endorses positive fetal movement.  She denies vaginal bleeding   or loss of fluid.  She denies contractions.  She denies headaches or changes   in vision.    REVIEW OF SYSTEMS:  Denies fevers, chills, shortness of breath, chest pain,   nausea, vomiting, diarrhea or dysuria.    PAST MEDICAL HISTORY:  Depression, recurrent urinary tract infections, history   of pyelonephritis.    PAST SURGICAL HISTORY:   section x2, inguinal hernia repair.    MEDICATIONS:  Prenatal vitamins, vitamin D 5000 international units once   daily, aspirin 81 mg once daily.    GYNECOLOGIC HISTORY:  Last menstrual period 2020.  Denies history of   sexually transmitted infections or genital herpes.    OBSTETRICAL HISTORY:  G1, , primary emergent classical   section   secondary to  labor at 27 weeks, 1 pound 14 ounces.  G2, 2014, repeat    section secondary to PPROM and  labor, 3 pounds 11 ounces.    G3, current prenatal care with myself.  Pregnancy complicated by history of    section x2 and prior classical  section.    ALLERGIES:  No known drug allergies.    SOCIAL HISTORY:  Denies tobacco use, alcohol use, or drug use.    FAMILY HISTORY:  Mother with Crohn's disease, clotting disorder, diabetes,   elevated cholesterol, heart disease, hypertension, and stroke.  Paternal aunt   with breast cancer.    PHYSICAL EXAMINATION:  VITAL SIGNS:   Vitals:    20 0613   BP: 126/81   Pulse: 83   Resp: 18   Temp: 36.5 °C (97.7 °F)   SpO2: 96%   GENERAL:  Alert, conversational, pleasant, no acute distress.  HEENT:  Moist mucous membranes.  CARDIOVASCULAR:  Regular rate.  PULMONARY:  No respiratory distress.  Symmetric expansion.  ABDOMEN:  Soft, nontender, nondistended.  Gravid.  Prior Pfannenstiel skin   incision present.  EXTREMITIES:  Moves all, no edema.  GENITOURINARY:  Deferred.    LABORATORY DATA:  Prenatal labs reviewed, O positive, antibody negative, RPR   nonreactive, hepatitis B surface antigen nonreactive, rubella immune.  Vitamin   D 20.  HIV nonreactive.  Genetic and carrier screening declined.  One-hour   glucose challenge test 88.  GBS negative.  Hepatitis C antibody negative.    IMAGING:  Anatomy ultrasound [facility] at [facility] shows a   single live intrauterine pregnancy, male infant with normal anatomy with the   exception of post-valvular dilation of the left ventricular outflow tract.    EFW 5 pounds 15 ounces, 61 percentile on .  The placenta is posterior   with no placenta previa.    ASSESSMENT:  A 30-year-old  5, para 0-2-2-2, at 36 weeks and 1 day by   last menstrual period, EDC 2020.    ASSESSMENT:  1.   intrauterine pregnancy at 36 weeks and 1 day.  2.  History of prior  classical  section.  3.  History of  section x2.  4.  History of  birth x2.  5.  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    DISCUSSION:  The patient was recommended repeat  section at 36-37   weeks per ACOG guidelines based on her history of prior classical    section.  Given the patient has been experiencing more frequent contractions,   she elected to proceed with repeat  section at 36 weeks.  She was   counseled in regards to the risks associated with prematurity, specifically   the risk of respiratory distress syndrome.  She did receive betamethasone last   Thursday and Friday.  After discussing with Dr. Matias, we will proceed   forward with repeat  section at 36 weeks and 1 day.  The patient was   consented for repeat  section in my office.  Please see my office   consent form for full details.    PLAN:  1.  Admit to L and D.  2.  CBC, type and screen.  3.  Continuous fetal heart tracing/tocometer.  4.  Spinal per Anesthesia.  5.  IV fluids 125 mL an hour.  6.  Sequential compression devices.  7.  N.p.o.       ____________________________________     MD ALICIA Chavez / NTS    DD:  2020 15:00:00  DT:  2020 15:26:25    D#:  [id number]  Job#:  [id number]

## 2020-11-12 ENCOUNTER — HOSPITAL ENCOUNTER (INPATIENT)
Facility: MEDICAL CENTER | Age: 30
LOS: 4 days | End: 2020-11-16
Attending: OBSTETRICS & GYNECOLOGY | Admitting: OBSTETRICS & GYNECOLOGY
Payer: MEDICAID

## 2020-11-12 ENCOUNTER — ANESTHESIA (OUTPATIENT)
Dept: OBGYN | Facility: MEDICAL CENTER | Age: 30
End: 2020-11-12
Payer: MEDICAID

## 2020-11-12 ENCOUNTER — ANESTHESIA EVENT (OUTPATIENT)
Dept: OBGYN | Facility: MEDICAL CENTER | Age: 30
End: 2020-11-12
Payer: MEDICAID

## 2020-11-12 DIAGNOSIS — G89.18 POSTOPERATIVE PAIN: ICD-10-CM

## 2020-11-12 LAB
BASOPHILS # BLD AUTO: 0.2 % (ref 0–1.8)
BASOPHILS # BLD: 0.03 K/UL (ref 0–0.12)
EOSINOPHIL # BLD AUTO: 0.09 K/UL (ref 0–0.51)
EOSINOPHIL NFR BLD: 0.7 % (ref 0–6.9)
ERYTHROCYTE [DISTWIDTH] IN BLOOD BY AUTOMATED COUNT: 41.6 FL (ref 35.9–50)
ERYTHROCYTE [DISTWIDTH] IN BLOOD BY AUTOMATED COUNT: 42 FL (ref 35.9–50)
HCT VFR BLD AUTO: 36.1 % (ref 37–47)
HCT VFR BLD AUTO: 37 % (ref 37–47)
HGB BLD-MCNC: 12.1 G/DL (ref 12–16)
HGB BLD-MCNC: 12.8 G/DL (ref 12–16)
HOLDING TUBE BB 8507: NORMAL
IMM GRANULOCYTES # BLD AUTO: 0.17 K/UL (ref 0–0.11)
IMM GRANULOCYTES NFR BLD AUTO: 1.4 % (ref 0–0.9)
LYMPHOCYTES # BLD AUTO: 2.69 K/UL (ref 1–4.8)
LYMPHOCYTES NFR BLD: 21.6 % (ref 22–41)
MCH RBC QN AUTO: 31.8 PG (ref 27–33)
MCH RBC QN AUTO: 32.5 PG (ref 27–33)
MCHC RBC AUTO-ENTMCNC: 33.5 G/DL (ref 33.6–35)
MCHC RBC AUTO-ENTMCNC: 34.6 G/DL (ref 33.6–35)
MCV RBC AUTO: 93.9 FL (ref 81.4–97.8)
MCV RBC AUTO: 95 FL (ref 81.4–97.8)
MONOCYTES # BLD AUTO: 0.92 K/UL (ref 0–0.85)
MONOCYTES NFR BLD AUTO: 7.4 % (ref 0–13.4)
NEUTROPHILS # BLD AUTO: 8.55 K/UL (ref 2–7.15)
NEUTROPHILS NFR BLD: 68.7 % (ref 44–72)
NRBC # BLD AUTO: 0 K/UL
NRBC BLD-RTO: 0 /100 WBC
PLATELET # BLD AUTO: 216 K/UL (ref 164–446)
PLATELET # BLD AUTO: 227 K/UL (ref 164–446)
PMV BLD AUTO: 10.9 FL (ref 9–12.9)
PMV BLD AUTO: 11.1 FL (ref 9–12.9)
RBC # BLD AUTO: 3.8 M/UL (ref 4.2–5.4)
RBC # BLD AUTO: 3.94 M/UL (ref 4.2–5.4)
WBC # BLD AUTO: 12.5 K/UL (ref 4.8–10.8)
WBC # BLD AUTO: 19.6 K/UL (ref 4.8–10.8)

## 2020-11-12 PROCEDURE — 700111 HCHG RX REV CODE 636 W/ 250 OVERRIDE (IP): Performed by: ANESTHESIOLOGY

## 2020-11-12 PROCEDURE — 700105 HCHG RX REV CODE 258: Performed by: ANESTHESIOLOGY

## 2020-11-12 PROCEDURE — 770002 HCHG ROOM/CARE - OB PRIVATE (112)

## 2020-11-12 PROCEDURE — 700101 HCHG RX REV CODE 250: Performed by: ANESTHESIOLOGY

## 2020-11-12 PROCEDURE — 85027 COMPLETE CBC AUTOMATED: CPT

## 2020-11-12 PROCEDURE — 700111 HCHG RX REV CODE 636 W/ 250 OVERRIDE (IP)

## 2020-11-12 PROCEDURE — 700102 HCHG RX REV CODE 250 W/ 637 OVERRIDE(OP): Performed by: ANESTHESIOLOGY

## 2020-11-12 PROCEDURE — A9270 NON-COVERED ITEM OR SERVICE: HCPCS | Performed by: OBSTETRICS & GYNECOLOGY

## 2020-11-12 PROCEDURE — A9270 NON-COVERED ITEM OR SERVICE: HCPCS | Performed by: ANESTHESIOLOGY

## 2020-11-12 PROCEDURE — 160041 HCHG SURGERY MINUTES - EA ADDL 1 MIN LEVEL 4: Performed by: OBSTETRICS & GYNECOLOGY

## 2020-11-12 PROCEDURE — 700102 HCHG RX REV CODE 250 W/ 637 OVERRIDE(OP): Performed by: OBSTETRICS & GYNECOLOGY

## 2020-11-12 PROCEDURE — 160002 HCHG RECOVERY MINUTES (STAT): Performed by: OBSTETRICS & GYNECOLOGY

## 2020-11-12 PROCEDURE — 59025 FETAL NON-STRESS TEST: CPT

## 2020-11-12 PROCEDURE — 160029 HCHG SURGERY MINUTES - 1ST 30 MINS LEVEL 4: Performed by: OBSTETRICS & GYNECOLOGY

## 2020-11-12 PROCEDURE — 160035 HCHG PACU - 1ST 60 MINS PHASE I: Performed by: OBSTETRICS & GYNECOLOGY

## 2020-11-12 PROCEDURE — 85025 COMPLETE CBC W/AUTO DIFF WBC: CPT

## 2020-11-12 PROCEDURE — 36415 COLL VENOUS BLD VENIPUNCTURE: CPT

## 2020-11-12 PROCEDURE — 160048 HCHG OR STATISTICAL LEVEL 1-5: Performed by: OBSTETRICS & GYNECOLOGY

## 2020-11-12 PROCEDURE — 160009 HCHG ANES TIME/MIN: Performed by: OBSTETRICS & GYNECOLOGY

## 2020-11-12 RX ORDER — VITAMIN A ACETATE, BETA CAROTENE, ASCORBIC ACID, CHOLECALCIFEROL, .ALPHA.-TOCOPHEROL ACETATE, DL-, THIAMINE MONONITRATE, RIBOFLAVIN, NIACINAMIDE, PYRIDOXINE HYDROCHLORIDE, FOLIC ACID, CYANOCOBALAMIN, CALCIUM CARBONATE, FERROUS FUMARATE, ZINC OXIDE, CUPRIC OXIDE 3080; 12; 120; 400; 1; 1.84; 3; 20; 22; 920; 25; 200; 27; 10; 2 [IU]/1; UG/1; MG/1; [IU]/1; MG/1; MG/1; MG/1; MG/1; MG/1; [IU]/1; MG/1; MG/1; MG/1; MG/1; MG/1
1 TABLET, FILM COATED ORAL
Status: DISCONTINUED | OUTPATIENT
Start: 2020-11-12 | End: 2020-11-16 | Stop reason: HOSPADM

## 2020-11-12 RX ORDER — MEPERIDINE HYDROCHLORIDE 25 MG/ML
12.5 INJECTION INTRAMUSCULAR; INTRAVENOUS; SUBCUTANEOUS
Status: DISCONTINUED | OUTPATIENT
Start: 2020-11-12 | End: 2020-11-12 | Stop reason: HOSPADM

## 2020-11-12 RX ORDER — LABETALOL HYDROCHLORIDE 5 MG/ML
5 INJECTION, SOLUTION INTRAVENOUS
Status: DISCONTINUED | OUTPATIENT
Start: 2020-11-12 | End: 2020-11-12 | Stop reason: HOSPADM

## 2020-11-12 RX ORDER — SODIUM CHLORIDE, SODIUM LACTATE, POTASSIUM CHLORIDE, CALCIUM CHLORIDE 600; 310; 30; 20 MG/100ML; MG/100ML; MG/100ML; MG/100ML
INJECTION, SOLUTION INTRAVENOUS PRN
Status: DISCONTINUED | OUTPATIENT
Start: 2020-11-12 | End: 2020-11-16 | Stop reason: HOSPADM

## 2020-11-12 RX ORDER — METOCLOPRAMIDE HYDROCHLORIDE 5 MG/ML
10 INJECTION INTRAMUSCULAR; INTRAVENOUS ONCE
Status: COMPLETED | OUTPATIENT
Start: 2020-11-12 | End: 2020-11-12

## 2020-11-12 RX ORDER — HYDROMORPHONE HYDROCHLORIDE 1 MG/ML
0.2 INJECTION, SOLUTION INTRAMUSCULAR; INTRAVENOUS; SUBCUTANEOUS
Status: ACTIVE | OUTPATIENT
Start: 2020-11-12 | End: 2020-11-13

## 2020-11-12 RX ORDER — OXYCODONE HYDROCHLORIDE 5 MG/1
5 TABLET ORAL EVERY 4 HOURS PRN
Status: ACTIVE | OUTPATIENT
Start: 2020-11-12 | End: 2020-11-13

## 2020-11-12 RX ORDER — OXYCODONE HCL 5 MG/5 ML
10 SOLUTION, ORAL ORAL
Status: DISCONTINUED | OUTPATIENT
Start: 2020-11-12 | End: 2020-11-12 | Stop reason: HOSPADM

## 2020-11-12 RX ORDER — OXYTOCIN 10 [USP'U]/ML
INJECTION, SOLUTION INTRAMUSCULAR; INTRAVENOUS PRN
Status: DISCONTINUED | OUTPATIENT
Start: 2020-11-12 | End: 2020-11-12 | Stop reason: SURG

## 2020-11-12 RX ORDER — SODIUM CHLORIDE, SODIUM GLUCONATE, SODIUM ACETATE, POTASSIUM CHLORIDE AND MAGNESIUM CHLORIDE 526; 502; 368; 37; 30 MG/100ML; MG/100ML; MG/100ML; MG/100ML; MG/100ML
1500 INJECTION, SOLUTION INTRAVENOUS ONCE
Status: COMPLETED | OUTPATIENT
Start: 2020-11-12 | End: 2020-11-12

## 2020-11-12 RX ORDER — ONDANSETRON 2 MG/ML
4 INJECTION INTRAMUSCULAR; INTRAVENOUS
Status: DISCONTINUED | OUTPATIENT
Start: 2020-11-12 | End: 2020-11-12 | Stop reason: HOSPADM

## 2020-11-12 RX ORDER — DIPHENHYDRAMINE HYDROCHLORIDE 50 MG/ML
12.5 INJECTION INTRAMUSCULAR; INTRAVENOUS EVERY 6 HOURS PRN
Status: ACTIVE | OUTPATIENT
Start: 2020-11-12 | End: 2020-11-13

## 2020-11-12 RX ORDER — IPRATROPIUM BROMIDE AND ALBUTEROL SULFATE 2.5; .5 MG/3ML; MG/3ML
3 SOLUTION RESPIRATORY (INHALATION)
Status: DISCONTINUED | OUTPATIENT
Start: 2020-11-12 | End: 2020-11-12 | Stop reason: HOSPADM

## 2020-11-12 RX ORDER — SCOLOPAMINE TRANSDERMAL SYSTEM 1 MG/1
1 PATCH, EXTENDED RELEASE TRANSDERMAL
Status: DISCONTINUED | OUTPATIENT
Start: 2020-11-12 | End: 2020-11-12 | Stop reason: HOSPADM

## 2020-11-12 RX ORDER — HALOPERIDOL 5 MG/ML
1 INJECTION INTRAMUSCULAR
Status: DISCONTINUED | OUTPATIENT
Start: 2020-11-12 | End: 2020-11-12 | Stop reason: HOSPADM

## 2020-11-12 RX ORDER — SODIUM CHLORIDE, SODIUM GLUCONATE, SODIUM ACETATE, POTASSIUM CHLORIDE AND MAGNESIUM CHLORIDE 526; 502; 368; 37; 30 MG/100ML; MG/100ML; MG/100ML; MG/100ML; MG/100ML
INJECTION, SOLUTION INTRAVENOUS
Status: DISCONTINUED | OUTPATIENT
Start: 2020-11-12 | End: 2020-11-12 | Stop reason: SURG

## 2020-11-12 RX ORDER — CEFAZOLIN SODIUM 1 G/3ML
1 INJECTION, POWDER, FOR SOLUTION INTRAMUSCULAR; INTRAVENOUS ONCE
Status: DISCONTINUED | OUTPATIENT
Start: 2020-11-12 | End: 2020-11-12 | Stop reason: HOSPADM

## 2020-11-12 RX ORDER — OXYCODONE HCL 5 MG/5 ML
5 SOLUTION, ORAL ORAL
Status: DISCONTINUED | OUTPATIENT
Start: 2020-11-12 | End: 2020-11-12 | Stop reason: HOSPADM

## 2020-11-12 RX ORDER — MORPHINE SULFATE 0.5 MG/ML
INJECTION, SOLUTION EPIDURAL; INTRATHECAL; INTRAVENOUS
Status: COMPLETED | OUTPATIENT
Start: 2020-11-12 | End: 2020-11-12

## 2020-11-12 RX ORDER — CITRIC ACID/SODIUM CITRATE 334-500MG
30 SOLUTION, ORAL ORAL ONCE
Status: COMPLETED | OUTPATIENT
Start: 2020-11-12 | End: 2020-11-12

## 2020-11-12 RX ORDER — CEFAZOLIN SODIUM 1 G/3ML
INJECTION, POWDER, FOR SOLUTION INTRAMUSCULAR; INTRAVENOUS PRN
Status: DISCONTINUED | OUTPATIENT
Start: 2020-11-12 | End: 2020-11-12 | Stop reason: SURG

## 2020-11-12 RX ORDER — MISOPROSTOL 200 UG/1
600 TABLET ORAL
Status: DISCONTINUED | OUTPATIENT
Start: 2020-11-12 | End: 2020-11-16 | Stop reason: HOSPADM

## 2020-11-12 RX ORDER — SIMETHICONE 80 MG
80 TABLET,CHEWABLE ORAL 4 TIMES DAILY PRN
Status: DISCONTINUED | OUTPATIENT
Start: 2020-11-12 | End: 2020-11-16 | Stop reason: HOSPADM

## 2020-11-12 RX ORDER — KETOROLAC TROMETHAMINE 30 MG/ML
INJECTION, SOLUTION INTRAMUSCULAR; INTRAVENOUS PRN
Status: DISCONTINUED | OUTPATIENT
Start: 2020-11-12 | End: 2020-11-12 | Stop reason: SURG

## 2020-11-12 RX ORDER — SODIUM CHLORIDE, SODIUM LACTATE, POTASSIUM CHLORIDE, CALCIUM CHLORIDE 600; 310; 30; 20 MG/100ML; MG/100ML; MG/100ML; MG/100ML
INJECTION, SOLUTION INTRAVENOUS CONTINUOUS
Status: DISCONTINUED | OUTPATIENT
Start: 2020-11-12 | End: 2020-11-12

## 2020-11-12 RX ORDER — HYDROMORPHONE HYDROCHLORIDE 1 MG/ML
0.1 INJECTION, SOLUTION INTRAMUSCULAR; INTRAVENOUS; SUBCUTANEOUS
Status: DISCONTINUED | OUTPATIENT
Start: 2020-11-12 | End: 2020-11-12 | Stop reason: HOSPADM

## 2020-11-12 RX ORDER — BUPIVACAINE HYDROCHLORIDE 7.5 MG/ML
INJECTION, SOLUTION INTRASPINAL
Status: COMPLETED | OUTPATIENT
Start: 2020-11-12 | End: 2020-11-12

## 2020-11-12 RX ORDER — ONDANSETRON 2 MG/ML
4 INJECTION INTRAMUSCULAR; INTRAVENOUS EVERY 6 HOURS PRN
Status: ACTIVE | OUTPATIENT
Start: 2020-11-12 | End: 2020-11-13

## 2020-11-12 RX ORDER — KETOROLAC TROMETHAMINE 30 MG/ML
30 INJECTION, SOLUTION INTRAMUSCULAR; INTRAVENOUS EVERY 6 HOURS
Status: COMPLETED | OUTPATIENT
Start: 2020-11-12 | End: 2020-11-13

## 2020-11-12 RX ORDER — DOCUSATE SODIUM 100 MG/1
100 CAPSULE, LIQUID FILLED ORAL 2 TIMES DAILY PRN
Status: DISCONTINUED | OUTPATIENT
Start: 2020-11-12 | End: 2020-11-16 | Stop reason: HOSPADM

## 2020-11-12 RX ORDER — SODIUM CHLORIDE, SODIUM LACTATE, POTASSIUM CHLORIDE, CALCIUM CHLORIDE 600; 310; 30; 20 MG/100ML; MG/100ML; MG/100ML; MG/100ML
INJECTION, SOLUTION INTRAVENOUS CONTINUOUS
Status: DISCONTINUED | OUTPATIENT
Start: 2020-11-12 | End: 2020-11-15 | Stop reason: HOSPADM

## 2020-11-12 RX ORDER — DIPHENHYDRAMINE HYDROCHLORIDE 50 MG/ML
25 INJECTION INTRAMUSCULAR; INTRAVENOUS EVERY 6 HOURS PRN
Status: ACTIVE | OUTPATIENT
Start: 2020-11-12 | End: 2020-11-13

## 2020-11-12 RX ORDER — ONDANSETRON 2 MG/ML
INJECTION INTRAMUSCULAR; INTRAVENOUS PRN
Status: DISCONTINUED | OUTPATIENT
Start: 2020-11-12 | End: 2020-11-12 | Stop reason: HOSPADM

## 2020-11-12 RX ORDER — HYDROMORPHONE HYDROCHLORIDE 1 MG/ML
0.4 INJECTION, SOLUTION INTRAMUSCULAR; INTRAVENOUS; SUBCUTANEOUS
Status: DISCONTINUED | OUTPATIENT
Start: 2020-11-12 | End: 2020-11-12 | Stop reason: HOSPADM

## 2020-11-12 RX ORDER — OXYCODONE HYDROCHLORIDE 10 MG/1
10 TABLET ORAL EVERY 4 HOURS PRN
Status: ACTIVE | OUTPATIENT
Start: 2020-11-12 | End: 2020-11-13

## 2020-11-12 RX ORDER — DEXAMETHASONE SODIUM PHOSPHATE 4 MG/ML
INJECTION, SOLUTION INTRA-ARTICULAR; INTRALESIONAL; INTRAMUSCULAR; INTRAVENOUS; SOFT TISSUE PRN
Status: DISCONTINUED | OUTPATIENT
Start: 2020-11-12 | End: 2020-11-12 | Stop reason: SURG

## 2020-11-12 RX ORDER — ACETAMINOPHEN 500 MG
1000 TABLET ORAL EVERY 6 HOURS
Status: COMPLETED | OUTPATIENT
Start: 2020-11-12 | End: 2020-11-13

## 2020-11-12 RX ORDER — HYDROMORPHONE HYDROCHLORIDE 1 MG/ML
0.2 INJECTION, SOLUTION INTRAMUSCULAR; INTRAVENOUS; SUBCUTANEOUS
Status: DISCONTINUED | OUTPATIENT
Start: 2020-11-12 | End: 2020-11-12 | Stop reason: HOSPADM

## 2020-11-12 RX ADMIN — DEXAMETHASONE SODIUM PHOSPHATE 8 MG: 4 INJECTION, SOLUTION INTRA-ARTICULAR; INTRALESIONAL; INTRAMUSCULAR; INTRAVENOUS; SOFT TISSUE at 07:55

## 2020-11-12 RX ADMIN — FAMOTIDINE 20 MG: 10 INJECTION, SOLUTION INTRAVENOUS at 06:30

## 2020-11-12 RX ADMIN — ACETAMINOPHEN 1000 MG: 500 TABLET ORAL at 12:47

## 2020-11-12 RX ADMIN — CEFAZOLIN 2 G: 330 INJECTION, POWDER, FOR SOLUTION INTRAMUSCULAR; INTRAVENOUS at 07:55

## 2020-11-12 RX ADMIN — METOCLOPRAMIDE 10 MG: 5 INJECTION, SOLUTION INTRAMUSCULAR; INTRAVENOUS at 07:20

## 2020-11-12 RX ADMIN — BUPIVACAINE HYDROCHLORIDE IN DEXTROSE 1.5 ML: 7.5 INJECTION, SOLUTION SUBARACHNOID at 08:00

## 2020-11-12 RX ADMIN — KETOROLAC TROMETHAMINE 30 MG: 30 INJECTION, SOLUTION INTRAMUSCULAR at 08:53

## 2020-11-12 RX ADMIN — OXYTOCIN 20 UNITS: 10 INJECTION, SOLUTION INTRAMUSCULAR; INTRAVENOUS at 08:25

## 2020-11-12 RX ADMIN — VITAMIN A, VITAMIN C, VITAMIN D-3, VITAMIN E, VITAMIN B-1, VITAMIN B-2, NIACIN, VITAMIN B-6, CALCIUM, IRON, ZINC, COPPER 1 TABLET: 4000; 120; 400; 22; 1.84; 3; 20; 10; 1; 12; 200; 27; 25; 2 TABLET ORAL at 12:48

## 2020-11-12 RX ADMIN — KETOROLAC TROMETHAMINE 30 MG: 30 INJECTION, SOLUTION INTRAMUSCULAR at 21:15

## 2020-11-12 RX ADMIN — SODIUM CHLORIDE, SODIUM GLUCONATE, SODIUM ACETATE, POTASSIUM CHLORIDE AND MAGNESIUM CHLORIDE 1500 ML: 526; 502; 368; 37; 30 INJECTION, SOLUTION INTRAVENOUS at 06:26

## 2020-11-12 RX ADMIN — ONDANSETRON 4 MG: 2 INJECTION INTRAMUSCULAR; INTRAVENOUS at 07:55

## 2020-11-12 RX ADMIN — Medication: at 09:10

## 2020-11-12 RX ADMIN — KETOROLAC TROMETHAMINE 30 MG: 30 INJECTION, SOLUTION INTRAMUSCULAR at 14:53

## 2020-11-12 RX ADMIN — ACETAMINOPHEN 1000 MG: 500 TABLET ORAL at 17:42

## 2020-11-12 RX ADMIN — SODIUM CITRATE AND CITRIC ACID MONOHYDRATE 30 ML: 500; 334 SOLUTION ORAL at 07:24

## 2020-11-12 RX ADMIN — SODIUM CHLORIDE, SODIUM GLUCONATE, SODIUM ACETATE, POTASSIUM CHLORIDE AND MAGNESIUM CHLORIDE: 526; 502; 368; 37; 30 INJECTION, SOLUTION INTRAVENOUS at 07:55

## 2020-11-12 RX ADMIN — MORPHINE SULFATE 150 MCG: 0.5 INJECTION, SOLUTION EPIDURAL; INTRATHECAL; INTRAVENOUS at 08:00

## 2020-11-12 SDOH — ECONOMIC STABILITY: FOOD INSECURITY: WITHIN THE PAST 12 MONTHS, YOU WORRIED THAT YOUR FOOD WOULD RUN OUT BEFORE YOU GOT MONEY TO BUY MORE.: NEVER TRUE

## 2020-11-12 SDOH — ECONOMIC STABILITY: FOOD INSECURITY: WITHIN THE PAST 12 MONTHS, THE FOOD YOU BOUGHT JUST DIDN'T LAST AND YOU DIDN'T HAVE MONEY TO GET MORE.: NEVER TRUE

## 2020-11-12 SDOH — ECONOMIC STABILITY: TRANSPORTATION INSECURITY
IN THE PAST 12 MONTHS, HAS LACK OF TRANSPORTATION KEPT YOU FROM MEETINGS, WORK, OR FROM GETTING THINGS NEEDED FOR DAILY LIVING?: NO

## 2020-11-12 SDOH — ECONOMIC STABILITY: TRANSPORTATION INSECURITY
IN THE PAST 12 MONTHS, HAS THE LACK OF TRANSPORTATION KEPT YOU FROM MEDICAL APPOINTMENTS OR FROM GETTING MEDICATIONS?: NO

## 2020-11-12 ASSESSMENT — EDINBURGH POSTNATAL DEPRESSION SCALE (EPDS)
I HAVE LOOKED FORWARD WITH ENJOYMENT TO THINGS: AS MUCH AS I EVER DID
I HAVE BEEN SO UNHAPPY THAT I HAVE BEEN CRYING: ONLY OCCASIONALLY
THINGS HAVE BEEN GETTING ON TOP OF ME: NO, MOST OF THE TIME I HAVE COPED QUITE WELL
I HAVE BLAMED MYSELF UNNECESSARILY WHEN THINGS WENT WRONG: NOT VERY OFTEN
I HAVE FELT SAD OR MISERABLE: NOT VERY OFTEN
I HAVE BEEN ANXIOUS OR WORRIED FOR NO GOOD REASON: NO, NOT AT ALL
I HAVE BEEN SO UNHAPPY THAT I HAVE HAD DIFFICULTY SLEEPING: NOT AT ALL
I HAVE BEEN ABLE TO LAUGH AND SEE THE FUNNY SIDE OF THINGS: AS MUCH AS I ALWAYS COULD
I HAVE FELT SCARED OR PANICKY FOR NO GOOD REASON: NO, NOT MUCH
THE THOUGHT OF HARMING MYSELF HAS OCCURRED TO ME: NEVER

## 2020-11-12 ASSESSMENT — LIFESTYLE VARIABLES
TOTAL SCORE: 0
EVER_SMOKED: NEVER
ALCOHOL_USE: NO
EVER HAD A DRINK FIRST THING IN THE MORNING TO STEADY YOUR NERVES TO GET RID OF A HANGOVER: NO
AVERAGE NUMBER OF DAYS PER WEEK YOU HAVE A DRINK CONTAINING ALCOHOL: 0
HAVE YOU EVER FELT YOU SHOULD CUT DOWN ON YOUR DRINKING: NO
ON A TYPICAL DAY WHEN YOU DRINK ALCOHOL HOW MANY DRINKS DO YOU HAVE: 0
DOES PATIENT WANT TO STOP DRINKING: NO
EVER FELT BAD OR GUILTY ABOUT YOUR DRINKING: NO
TOTAL SCORE: 0
HOW MANY TIMES IN THE PAST YEAR HAVE YOU HAD 5 OR MORE DRINKS IN A DAY: 0
TOTAL SCORE: 0
HAVE PEOPLE ANNOYED YOU BY CRITICIZING YOUR DRINKING: NO
CONSUMPTION TOTAL: NEGATIVE

## 2020-11-12 ASSESSMENT — PAIN SCALES - GENERAL
PAINLEVEL: 0 - NO PAIN
PAIN_LEVEL: 0

## 2020-11-12 ASSESSMENT — PATIENT HEALTH QUESTIONNAIRE - PHQ9
2. FEELING DOWN, DEPRESSED, IRRITABLE, OR HOPELESS: NOT AT ALL
SUM OF ALL RESPONSES TO PHQ9 QUESTIONS 1 AND 2: 0
1. LITTLE INTEREST OR PLEASURE IN DOING THINGS: NOT AT ALL

## 2020-11-12 ASSESSMENT — FIBROSIS 4 INDEX
FIB4 SCORE: 0.88
FIB4 SCORE: 0.88

## 2020-11-12 ASSESSMENT — PAIN DESCRIPTION - PAIN TYPE
TYPE: SURGICAL PAIN

## 2020-11-12 NOTE — PROCEDURES
Date: 2020    Pre-Operative Diagnosis:   1.   intrauterine pregnancy at 36 weeks and 1 day.  2.  History of prior classical  section.  3.  History of  section x2.  4.  History of  birth x2.  5.  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    Post-Operative Diagnosis: Same as above    Procedure: Repeat  section via Pfannensteil skin incision    Primary OB/GYN: Mary Lou Broderick M.D.    Primary Surgeon: Mary Lou Broderick M.D.     Assistant Surgeon: Maddison Nicholson M.D.     Anesthesiologist: Wesley Addison M.D.     Anesthesia: Spinal    Complications: None    EBL: 500 ml    UOP: 300 ml clear urine at the end of the procedure    Indication: History of  section x 2. History of prior classical  section.     Consent: I personally discussed with the patient in regards to the risk/benefits/alternatives of undergoing a repeat  section. The indication was discussed with the patient and the patient acknowledged that she understood this indiction. The risk of the procedure was explained to the patient which includes but is not limited to: risk of infection and the use of pre-operative antibiotics. The risk of bleeding intra-operatively as well as post-operatively and the use of blood transfusion and/or transfusion of other blood products in the event of at emergency was discussed with the patient. The patient verbally consented to receiving blood products in the event of an emergency. The risk of injury to surrounding organs was discussed with the patient, which includes but is not limited to the following organs: uterus, fallopian tubes, ovaries, ureters, bladder, bowel and any other surrounding nerves, arteries or veins. The risk of injury to the fetus during delivery was also discussed with the patient. The risk of DVT/PE and the use of intra-operative and postoperative SCDs, as well as early ambulation was also discussed with the patient.  All questions/concerns answered. The patient understood the risks, benefits, alternative and indication of the procedure and verbally consented to a repeat  section.     Findings: Viable male infant in cephalic presentation. Clear fluid upon artificial rupture of membranes with no nucal cord. Pediatrics present for delivery. Apgars of 8 at 1 min and 8 at 5 mins of life. Weight 6lbs 3.5oz. (2,820g) Cord gases: Arterial pH 7.32, BE -2.0, Venous pH 7.35, BE -2/0. Normal uterus/tubes/ovaries. Lower uterine segment window measuring 3.0cm by 3.0cm. No rectus muscles present on the patient's right. Poor fascial planes and significant fascial scar tissue.     Procedure: The patient was taken to the operating room where spinal anesthesia was placed without difficulty. She was prepared and draped in the normal sterile fashion, in the dorsal supine position with a leftward tilt. Pre-procedure fetal heart tones were noted to be appropriate for gestational age. After a timeout and confirming adequate anesthesia, a Pfannenstiel skin incision was made with a scalpel and carried down to the underlying layer of fascia with a bovie. The fascia was incised in the midline, and the incision extended laterally with the Laird scissors.  The superior aspect of the fascial incision was grasped with the Kocher clamps, elevated, and the underlying rectus muscles dissected off in bluntly and with the use of the Laird scissors.  Attention was then turned to the inferior aspect of the incision, which in a similar fashion was grasped with the Kocher clamps, tented up, and the rectus muscles dissected off dissected off with the Laird scissors. The rectus muscles were  in the midline. The peritoneum was grasped using hemostats and entered sharply with Metzenbaum scissors. The incisions was extended superiorly and inferiorly with good visualization of the bladder and care to avoid any adhered bowel or omentum.     The bladder blade was  then inserted. The vesicouterine peritoneum was identified, grasped with pickups and entered sharply with Metzenbaum scissors. The incision was extended laterally, and a bladder flap was created digitally. The bladder blade was reinserted. The lower uterine segment was incised in a U-shaped fashion with the scalpel.  The uterine incision was then extended laterally with blunt traction. Membranes were ruptured with Allis clamps with clear fluid. The bladder blade was removed, and the infant's head was delivered with the aid of fundal pressure. The infant was delivered atraumatically. The mouth and nose were suctioned with the bulb.  The cord was clamped and cut after a 30 second delay.  The infant was handed off to the the  team. A cord gas segment and cord blood specimen was collected and sent. The placenta was removed manually.     The uterus was exteriorized, cleared of all clots and debris. The uterine incision was repaired with a 1-0 Chromic on a CTX in a running locked fashion.  A second layer of the same suture was used to obtain additional hemostasis via an imbrication layer. Adequate hemostasis was obtained. The cul-de-sac was cleared of clots and debris.  The uterus, tubes, and ovaries were noted to be normal, then returned to the abdomen.  The gutters were also cleared of clots and debris. The uterine incision was reinspected and noted to be hemostatic. The peritoneum was re-approximated with 2-0 Vicryl on a CT-1 in a running fashion. The fascia was re-approximated with 0 Vicryl on a CT-1 in a running fashion.  Irrigation with normal saline was performed and hemostasis of the subcutaneous layer was achieved with the Bovie electrocautery. Subcutaneous layer was closed with 2-0 Vicryl sutures. The skin was closed with 4-0 Vicryl on a PC5.  A pressure dressing was applied.  The patient tolerated the procedure well. Sponge, lap, needle counts were correct x3. The patient was taken to the recovery room  in stable condition.     Mary Lou Broderick M.D.

## 2020-11-12 NOTE — ANESTHESIA PREPROCEDURE EVALUATION
with two prior  deliveries. Reports emergent with first child requiring general anesthesia and semi urgent with second child. Classical incision per RN.     Denies other health issues.     Relevant Problems   No relevant active problems       Physical Exam    Airway   Mallampati: II  TM distance: >3 FB  Neck ROM: full       Cardiovascular - normal exam  Rhythm: regular  Rate: normal  (-) murmur     Dental - normal exam           Pulmonary - normal exam  Breath sounds clear to auscultation     Abdominal    Neurological - normal exam                 Anesthesia Plan    ASA 2       Plan - spinal   Neuraxial block will be primary anesthetic            Postoperative Plan: Postoperative administration of opioids is intended.    Pertinent diagnostic labs and testing reviewed    Informed Consent:    Anesthetic plan and risks discussed with patient.

## 2020-11-12 NOTE — ANESTHESIA PROCEDURE NOTES
Spinal Block    Date/Time: 11/12/2020 8:00 AM  Performed by: Wesley Addison M.D.  Authorized by: Wesley Addison M.D.     Patient Location:  OB  Start Time:  11/12/2020 8:00 AM  End Time:  11/12/2020 8:01 AM  Reason for Block: primary anesthetic    patient identified, IV checked, site marked, risks and benefits discussed, surgical consent, monitors and equipment checked, pre-op evaluation and timeout performed    Patient Position:  Sitting  Prep: ChloraPrep, patient draped and sterile technique    Monitoring:  Blood pressure, continuous pulse oximetry and heart rate  Approach:  Midline  Location:  L4-5  Injection Technique:  Single-shot  Skin infiltration:  Lidocaine  Strength:  1%  Dose:  3ml  Needle Type:  Pencan  Needle Gauge:  25 G  CSF flowing pre/post injection:  Yes  Sensory Level:  T4

## 2020-11-12 NOTE — CARE PLAN
Problem: Altered physiologic condition related to postoperative  delivery  Goal: Patient physiologically stable as evidenced by normal lochia, palpable uterine involution and vital signs within normal limits  Outcome: PROGRESSING AS EXPECTED  Note: Fundus firm, lochia light,blood pressure and other vitals stable.  Patient intake of fluids wnl. Post delivery hematocrit and hemoglobin  is stable.      Problem: Potential for postpartum infection related to surgical incision, compromised uterine condition, urinary tract or respiratory compromise  Goal: Patient will be afebrile and free from signs and symptoms of infection  Outcome: PROGRESSING AS EXPECTED  Note: Patient vitals stable.temp wnl. Patient has no fever or chills. Patient hasq3-4 hour pad changes. Cbc shows no signs of infection.

## 2020-11-12 NOTE — ANESTHESIA POSTPROCEDURE EVALUATION
Patient: Lorenza Rodriguez    Procedure Summary     Date: 20 Room / Location: [facility] OR  / LABOR AND DELIVERY    Anesthesia Start: 755 Anesthesia Stop: 904    Procedure:  SECTION, REPEAT Diagnosis:        delivery delivered      (HISTORY OF PRIOR CLASSICAL  SECTION )      (36.1)    Surgeons: Mary Lou Broderick M.D. Responsible Provider: Wesley Addison M.D.    Anesthesia Type: spinal ASA Status: 2          Final Anesthesia Type: spinal  Last vitals  BP   Blood Pressure: 126/81    Temp   36.5 °C (97.7 °F)    Pulse   Pulse: 83(Simultaneous filing. User may not have seen previous data.)   Resp   18    SpO2   96 %(Simultaneous filing. User may not have seen previous data.)      Anesthesia Post Evaluation    Patient location during evaluation: PACU  Patient participation: complete - patient participated  Level of consciousness: awake and alert  Pain score: 0    Airway patency: patent  Anesthetic complications: no  Cardiovascular status: hemodynamically stable  Respiratory status: acceptable  Hydration status: euvolemic    PONV: none           Nurse Pain Score: 2 (NPRS)

## 2020-11-12 NOTE — ANESTHESIA TIME REPORT
Anesthesia Start and Stop Event Times     Date Time Event    2020 0728 Ready for Procedure     0755 Anesthesia Start     0904 Anesthesia Stop        Responsible Staff  20    Name Role Begin End    Wesley Addison M.D. Anesth 0755 0904        Preop Diagnosis (Free Text):  Pre-op Diagnosis     HISTORY OF PRIOR CLASSICAL  SECTION   36.1        Preop Diagnosis (Codes):  Diagnosis Information     Diagnosis Code(s):  delivery delivered [O82]        Post op Diagnosis  Pregnancy      Premium Reason  Non-Premium    Comments:

## 2020-11-12 NOTE — LACTATION NOTE
This mom's third early delivery. First baby 27 weeks, 2nd baby 31 weeks, this baby LPI of 36 weeks.  Mom pumped successfully with her first 2 babies.     Baby is now 4 hours old and in NICU. Discussed with mom, (dad resting in the bed with mom), the importance of starting pumping as soon as possible after delivery.Mom asked if she could wait since her and dad were resting.    Mom encouraged to call for Lactation/RN when ready to pump.

## 2020-11-13 PROCEDURE — 700111 HCHG RX REV CODE 636 W/ 250 OVERRIDE (IP): Performed by: ANESTHESIOLOGY

## 2020-11-13 PROCEDURE — 700102 HCHG RX REV CODE 250 W/ 637 OVERRIDE(OP): Performed by: OBSTETRICS & GYNECOLOGY

## 2020-11-13 PROCEDURE — 700102 HCHG RX REV CODE 250 W/ 637 OVERRIDE(OP): Performed by: ANESTHESIOLOGY

## 2020-11-13 PROCEDURE — 770002 HCHG ROOM/CARE - OB PRIVATE (112)

## 2020-11-13 PROCEDURE — A9270 NON-COVERED ITEM OR SERVICE: HCPCS | Performed by: OBSTETRICS & GYNECOLOGY

## 2020-11-13 PROCEDURE — A9270 NON-COVERED ITEM OR SERVICE: HCPCS | Performed by: ANESTHESIOLOGY

## 2020-11-13 RX ORDER — KETOROLAC TROMETHAMINE 30 MG/ML
30 INJECTION, SOLUTION INTRAMUSCULAR; INTRAVENOUS EVERY 6 HOURS
Status: DISCONTINUED | OUTPATIENT
Start: 2020-11-13 | End: 2020-11-13

## 2020-11-13 RX ORDER — IBUPROFEN 600 MG/1
600 TABLET ORAL EVERY 6 HOURS
Status: DISCONTINUED | OUTPATIENT
Start: 2020-11-14 | End: 2020-11-16 | Stop reason: HOSPADM

## 2020-11-13 RX ORDER — ONDANSETRON 4 MG/1
4 TABLET, ORALLY DISINTEGRATING ORAL EVERY 6 HOURS PRN
Status: DISCONTINUED | OUTPATIENT
Start: 2020-11-13 | End: 2020-11-16 | Stop reason: HOSPADM

## 2020-11-13 RX ORDER — IBUPROFEN 600 MG/1
600 TABLET ORAL EVERY 6 HOURS PRN
Status: DISCONTINUED | OUTPATIENT
Start: 2020-11-13 | End: 2020-11-16 | Stop reason: HOSPADM

## 2020-11-13 RX ORDER — OXYCODONE HYDROCHLORIDE 5 MG/1
5 TABLET ORAL EVERY 4 HOURS PRN
Status: DISCONTINUED | OUTPATIENT
Start: 2020-11-13 | End: 2020-11-14

## 2020-11-13 RX ORDER — ONDANSETRON 2 MG/ML
4 INJECTION INTRAMUSCULAR; INTRAVENOUS EVERY 6 HOURS PRN
Status: DISCONTINUED | OUTPATIENT
Start: 2020-11-13 | End: 2020-11-16 | Stop reason: HOSPADM

## 2020-11-13 RX ADMIN — ACETAMINOPHEN 1000 MG: 500 TABLET ORAL at 06:08

## 2020-11-13 RX ADMIN — OXYCODONE HYDROCHLORIDE 5 MG: 5 TABLET ORAL at 21:20

## 2020-11-13 RX ADMIN — OXYCODONE HYDROCHLORIDE 5 MG: 5 TABLET ORAL at 17:15

## 2020-11-13 RX ADMIN — KETOROLAC TROMETHAMINE 30 MG: 30 INJECTION, SOLUTION INTRAMUSCULAR at 02:46

## 2020-11-13 RX ADMIN — VITAMIN A, VITAMIN C, VITAMIN D-3, VITAMIN E, VITAMIN B-1, VITAMIN B-2, NIACIN, VITAMIN B-6, CALCIUM, IRON, ZINC, COPPER 1 TABLET: 4000; 120; 400; 22; 1.84; 3; 20; 10; 1; 12; 200; 27; 25; 2 TABLET ORAL at 08:11

## 2020-11-13 RX ADMIN — KETOROLAC TROMETHAMINE 30 MG: 30 INJECTION, SOLUTION INTRAMUSCULAR at 08:12

## 2020-11-13 RX ADMIN — DOCUSATE SODIUM 100 MG: 100 CAPSULE ORAL at 00:34

## 2020-11-13 RX ADMIN — SIMETHICONE 80 MG: 80 TABLET, CHEWABLE ORAL at 00:34

## 2020-11-13 RX ADMIN — IBUPROFEN 600 MG: 600 TABLET, FILM COATED ORAL at 17:15

## 2020-11-13 RX ADMIN — IBUPROFEN 600 MG: 600 TABLET, FILM COATED ORAL at 23:34

## 2020-11-13 RX ADMIN — ACETAMINOPHEN 1000 MG: 500 TABLET ORAL at 00:32

## 2020-11-13 ASSESSMENT — PAIN DESCRIPTION - PAIN TYPE: TYPE: SURGICAL PAIN

## 2020-11-13 NOTE — LACTATION NOTE
Pumping with hospital grade breast pump initiated.  MOB stated she has previous pumping experience with pumping for an infant in the NICU.    Provided verbal and written instructions on pump assembly, pump operation, and cleaning of pump parts.  Pumping kit with cleaning supplies provided.  Pump settings reviewed with MOB and are: 80 CPM down to 60 after 2 minutes/suction set to comfort at 30%- MOB informed she may increase or decrease per comfort level/pumps for 15 minutes.  MOB denied pain with pump use.    Pumping Schedule:   MOB was encouraged to pump every 2-3 hours with one 5 hour stretch at night between two pumping sessions to allow for sleep.  MOB also instructed to perform 2-3 minutes of hand expression at each breast following each pumping session.    Demonstrated and taught MOB on how to perform hand expression.    MOB stated she does not have a breast pump for home use.  MOB provided with hospital grade breast pump rental information available to her through the [facility] [facility].    MOB encouraged to do skin to skin with infant whenever possible and to have an article of clothing with infant's smell on it nearby when pumping to help facilitate and increase milk production.    Discussed the effect of supply and demand on milk production.    MOB verbalized understanding of all information provided to her and denied having any further lactation questions and/or concerns at this time.  Encouraged MOB to call for lactation assistance as needed.

## 2020-11-13 NOTE — PROGRESS NOTES
Assumed care. Resting in bed and in no distress. CSection incision w/ silver mepilex in place, scant old drainage noted, no increase from marked area. Abd soft, +flatus, denies n/v. Voiding w/out problems.

## 2020-11-13 NOTE — LACTATION NOTE
1230-MOB was in the shower when LC attempted to see her, LC will attempt again later    1355-MOB was not in the room when LC attempted to see her, LC will attempt again later

## 2020-11-13 NOTE — DISCHARGE PLANNING
Discharge Planning Assessment Post Partum    Reason for Referral: History of depression and infant in the NICU  Address: [address] [place], [place] [address] [address]  Phone: [phone]  Type of Living Situation: living with FOB and children  Mom Diagnosis: Pregnancy,   Baby Diagnosis: Prematurity-36.1 weeks  Primary Language: English    Name of Baby: Srini Stahl (: 20)  Father of the Baby: Charanjit Stahl   Involved in baby’s care? Yes  Contact Information: [phone]    Prenatal Care: Yes  Mom's PCP: Dr. Jenny Lambert  PCP for new baby: ROSALINDA Cason    Support System: FOB  Coping/Bonding between mother & baby: Yes  Source of Feeding: MOB is pumping  Supplies for Infant: prepared for infant    Mom's Insurance: Medicaid  Baby Covered on Insurance:Yes  Mother Employed/School: Not currently  Other children in the home/names & ages: Two children; ages 13 and 6    Financial Hardship/Income: denies   Mom's Mental status: alert and oriented  Services used prior to admit: Medicaid, food stamps, and is planning to apply for [facility]    CPS History: No  Psychiatric History: history of depression-discussed post partum depression with MOB and provided her with a list of counseling resources specializing in maternal mental health.  MOB scored a 5 on the EPDS screen.  Domestic Violence History: No  Drug/ETOH History: No    Resources Provided: post partum support and counseling resources, children and family resource list, and a list of [facility] clinics provided  Referrals Made: diaper bank referral provided     Clearance for Discharge: Infant is cleared to discharge home with parents once medically cleared.     Ongoing Plan:  will continue to follow and assist as needed.

## 2020-11-13 NOTE — PROGRESS NOTES
2000 Received awake on bed with manrique catheter in placed, Assessment done wound covered with Mepilex Silver with very scant old drainage, Pt denies pain at this time, Encourage more ambulation, Encourage to call if she needs assistance,  Needs attended.

## 2020-11-13 NOTE — LACTATION NOTE
1435-MOB not in room when LC attempted to see her, RN Tony texted baby's nurse in NICU who states MOB is not there, LC will attempt to see again when able

## 2020-11-13 NOTE — PROGRESS NOTES
POD#1  S) pt without c/o, manrique out, urinating  O) vss  Inc: mepilex covering - small area of bleed  Ext; no edema  Hgb: 12.1  A/P POD#1, s/p repeat c/s #3   stable - continue orders

## 2020-11-13 NOTE — CARE PLAN
Problem: Communication  Goal: The ability to communicate needs accurately and effectively will improve  Outcome: PROGRESSING AS EXPECTED     Problem: Safety  Goal: Will remain free from injury  Outcome: PROGRESSING AS EXPECTED     Problem: Safety  Goal: Will remain free from falls  Outcome: PROGRESSING AS EXPECTED     Problem: Infection  Goal: Will remain free from infection  Outcome: PROGRESSING AS EXPECTED     Problem: Venous Thromboembolism (VTW)/Deep Vein Thrombosis (DVT) Prevention:  Goal: Patient will participate in Venous Thrombosis (VTE)/Deep Vein Thrombosis (DVT)Prevention Measures  Outcome: PROGRESSING AS EXPECTED     Problem: Bowel/Gastric:  Goal: Will not experience complications related to bowel motility  Outcome: PROGRESSING AS EXPECTED

## 2020-11-14 PROCEDURE — 770002 HCHG ROOM/CARE - OB PRIVATE (112)

## 2020-11-14 PROCEDURE — 700102 HCHG RX REV CODE 250 W/ 637 OVERRIDE(OP): Performed by: OBSTETRICS & GYNECOLOGY

## 2020-11-14 PROCEDURE — A9270 NON-COVERED ITEM OR SERVICE: HCPCS | Performed by: OBSTETRICS & GYNECOLOGY

## 2020-11-14 RX ORDER — OXYCODONE HYDROCHLORIDE 10 MG/1
10 TABLET ORAL EVERY 4 HOURS PRN
Status: DISCONTINUED | OUTPATIENT
Start: 2020-11-14 | End: 2020-11-16 | Stop reason: HOSPADM

## 2020-11-14 RX ADMIN — OXYCODONE HYDROCHLORIDE 5 MG: 5 TABLET ORAL at 01:18

## 2020-11-14 RX ADMIN — OXYCODONE HYDROCHLORIDE 10 MG: 10 TABLET ORAL at 23:39

## 2020-11-14 RX ADMIN — IBUPROFEN 600 MG: 600 TABLET, FILM COATED ORAL at 05:13

## 2020-11-14 RX ADMIN — OXYCODONE HYDROCHLORIDE 10 MG: 10 TABLET ORAL at 11:18

## 2020-11-14 RX ADMIN — IBUPROFEN 600 MG: 600 TABLET ORAL at 19:20

## 2020-11-14 RX ADMIN — VITAMIN A, VITAMIN C, VITAMIN D-3, VITAMIN E, VITAMIN B-1, VITAMIN B-2, NIACIN, VITAMIN B-6, CALCIUM, IRON, ZINC, COPPER 1 TABLET: 4000; 120; 400; 22; 1.84; 3; 20; 10; 1; 12; 200; 27; 25; 2 TABLET ORAL at 11:16

## 2020-11-14 RX ADMIN — OXYCODONE HYDROCHLORIDE 5 MG: 5 TABLET ORAL at 05:13

## 2020-11-14 RX ADMIN — IBUPROFEN 600 MG: 600 TABLET ORAL at 08:00

## 2020-11-14 ASSESSMENT — PATIENT HEALTH QUESTIONNAIRE - PHQ9
1. LITTLE INTEREST OR PLEASURE IN DOING THINGS: NOT AT ALL
SUM OF ALL RESPONSES TO PHQ9 QUESTIONS 1 AND 2: 0
2. FEELING DOWN, DEPRESSED, IRRITABLE, OR HOPELESS: NOT AT ALL

## 2020-11-14 ASSESSMENT — PAIN DESCRIPTION - PAIN TYPE
TYPE: SURGICAL PAIN
TYPE: ACUTE PAIN

## 2020-11-14 NOTE — PROGRESS NOTES
"POD#2    S: Pain has not been well controlled. Lochia normal. Working on BF. Baby in NICU. +flatus and void. No BM. Nataliia PO     O: /98   Pulse 67   Temp 36.5 °C (97.7 °F) (Temporal)   Resp 18   Ht 1.676 m (5' 6\")   Wt 62.6 kg (138 lb)   SpO2 97%   BP Range; 108-142/67-98  Gen: NAD  Fundus: firm below umbilicus  INcision: small dry blood   Ext: no c/c/e    Labs: hgb 11.7-->12.8-->12.1, Covid neg, O+, RI     A/P 29yo  s/p ERCS  1. Routine post op. Increased to oxycodone 10mg   2. Baby with known cardiac anomaly   3. Home in 1-2 day s  "

## 2020-11-14 NOTE — PROGRESS NOTES
0700-- Received report from IRENE Beltran, Infant in NICU.  Pt resting in bed. Discussed pain management for the day.  No further needs at the time.  Call light within reach, bed locked and in lowest position.  Rounding in place.    0900-- Assessment completed, VSS, Pt declines PRN pain medication at this time.  Discussed plan of care for the day that pt is comfortable with.  All questions answered at this time.  Will continue to monitor.

## 2020-11-14 NOTE — PROGRESS NOTES
Report received from IRENE Morales. Patient in bed, rates pain 2/10 but comfortable. Patient states she would like to receive pain medications as they become available. Whiteboards updated, POC discussed. Call light within reach. Patient encouraged to call with any needs and or concerns.

## 2020-11-14 NOTE — LACTATION NOTE
LC met with MOB for follow-up visit, MOB states pumping has been getting better however she also reports she is having nipple pain and scabbing, educated on the importance of properly fitting flanges as well as the importance of not setting suction to a level which is painful, explained supply and demand in relation to milk supply, discussed expectations regarding maternal milk supply and milk expression when pumping the first couple of days after delivery, MOB instructed to call next time she pumps so LC can assess flange fit.

## 2020-11-14 NOTE — CARE PLAN
Problem: Potential for postpartum infection related to surgical incision, compromised uterine condition, urinary tract or respiratory compromise  Goal: Patient will be afebrile and free from signs and symptoms of infection  Outcome: PROGRESSING AS EXPECTED  Note: Pt remains afebrile. No signs or symptoms of infection. Mepilex dressing intact with old drainage.      Problem: Alteration in comfort related to surgical incision and/or after birth pains  Goal: Patient is able to ambulate, care for self and infant with acceptable pain level  Outcome: PROGRESSING AS EXPECTED  Note: Patient caring for self. Going to ICN to care for infant. Patient states she would like to receive pain medications as they become available. Heat packs given.

## 2020-11-14 NOTE — LACTATION NOTE
"Spoke to MOB in NICU during rounds.  MOB expressed \"frustation\" at not receiving any colostrum when pumping.  Educated MOB on the milk making process and discussed the effect anxiety and stress can play in delaying the onset of milk production.  MOB verbalized understanding and reported she continues to pump as instructed.    Will provide 22.5 mm insert to MOB to apply at her right breast to see if this flange size is a better fit.    Encouraged MOB to continue to follow pumping plan as previously instructed.  "

## 2020-11-15 LAB
ALBUMIN SERPL BCP-MCNC: 3.3 G/DL (ref 3.2–4.9)
ALBUMIN/GLOB SERPL: 1.3 G/DL
ALP SERPL-CCNC: 107 U/L (ref 30–99)
ALT SERPL-CCNC: 50 U/L (ref 2–50)
ANION GAP SERPL CALC-SCNC: 7 MMOL/L (ref 7–16)
AST SERPL-CCNC: 75 U/L (ref 12–45)
BASOPHILS # BLD AUTO: 0.3 % (ref 0–1.8)
BASOPHILS # BLD: 0.03 K/UL (ref 0–0.12)
BILIRUB SERPL-MCNC: 0.4 MG/DL (ref 0.1–1.5)
BUN SERPL-MCNC: 12 MG/DL (ref 8–22)
CALCIUM SERPL-MCNC: 9.4 MG/DL (ref 8.5–10.5)
CHLORIDE SERPL-SCNC: 103 MMOL/L (ref 96–112)
CO2 SERPL-SCNC: 28 MMOL/L (ref 20–33)
CREAT SERPL-MCNC: 0.75 MG/DL (ref 0.5–1.4)
CREAT UR-MCNC: 109.09 MG/DL
EOSINOPHIL # BLD AUTO: 0.08 K/UL (ref 0–0.51)
EOSINOPHIL NFR BLD: 0.8 % (ref 0–6.9)
ERYTHROCYTE [DISTWIDTH] IN BLOOD BY AUTOMATED COUNT: 42.8 FL (ref 35.9–50)
GLOBULIN SER CALC-MCNC: 2.6 G/DL (ref 1.9–3.5)
GLUCOSE SERPL-MCNC: 82 MG/DL (ref 65–99)
HCT VFR BLD AUTO: 34.7 % (ref 37–47)
HGB BLD-MCNC: 11.6 G/DL (ref 12–16)
IMM GRANULOCYTES # BLD AUTO: 0.05 K/UL (ref 0–0.11)
IMM GRANULOCYTES NFR BLD AUTO: 0.5 % (ref 0–0.9)
LYMPHOCYTES # BLD AUTO: 2.01 K/UL (ref 1–4.8)
LYMPHOCYTES NFR BLD: 19.3 % (ref 22–41)
MCH RBC QN AUTO: 32.6 PG (ref 27–33)
MCHC RBC AUTO-ENTMCNC: 33.4 G/DL (ref 33.6–35)
MCV RBC AUTO: 97.5 FL (ref 81.4–97.8)
MONOCYTES # BLD AUTO: 0.61 K/UL (ref 0–0.85)
MONOCYTES NFR BLD AUTO: 5.9 % (ref 0–13.4)
NEUTROPHILS # BLD AUTO: 7.61 K/UL (ref 2–7.15)
NEUTROPHILS NFR BLD: 73.2 % (ref 44–72)
NRBC # BLD AUTO: 0 K/UL
NRBC BLD-RTO: 0 /100 WBC
PLATELET # BLD AUTO: 199 K/UL (ref 164–446)
PMV BLD AUTO: 10 FL (ref 9–12.9)
POTASSIUM SERPL-SCNC: 3.9 MMOL/L (ref 3.6–5.5)
PROT SERPL-MCNC: 5.9 G/DL (ref 6–8.2)
PROT UR-MCNC: 14 MG/DL (ref 0–15)
PROT/CREAT UR: 128 MG/G (ref 10–107)
RBC # BLD AUTO: 3.56 M/UL (ref 4.2–5.4)
SODIUM SERPL-SCNC: 138 MMOL/L (ref 135–145)
URATE SERPL-MCNC: 5.5 MG/DL (ref 1.9–8.2)
WBC # BLD AUTO: 10.4 K/UL (ref 4.8–10.8)

## 2020-11-15 PROCEDURE — 85025 COMPLETE CBC W/AUTO DIFF WBC: CPT

## 2020-11-15 PROCEDURE — 84550 ASSAY OF BLOOD/URIC ACID: CPT

## 2020-11-15 PROCEDURE — A9270 NON-COVERED ITEM OR SERVICE: HCPCS | Performed by: OBSTETRICS & GYNECOLOGY

## 2020-11-15 PROCEDURE — 700102 HCHG RX REV CODE 250 W/ 637 OVERRIDE(OP): Performed by: OBSTETRICS & GYNECOLOGY

## 2020-11-15 PROCEDURE — 80053 COMPREHEN METABOLIC PANEL: CPT

## 2020-11-15 PROCEDURE — 84156 ASSAY OF PROTEIN URINE: CPT

## 2020-11-15 PROCEDURE — 770002 HCHG ROOM/CARE - OB PRIVATE (112)

## 2020-11-15 PROCEDURE — 82570 ASSAY OF URINE CREATININE: CPT

## 2020-11-15 PROCEDURE — 36415 COLL VENOUS BLD VENIPUNCTURE: CPT

## 2020-11-15 RX ORDER — IBUPROFEN 600 MG/1
600 TABLET ORAL EVERY 6 HOURS
Qty: 30 TAB | Refills: 1 | Status: SHIPPED | OUTPATIENT
Start: 2020-11-15 | End: 2020-11-16

## 2020-11-15 RX ORDER — LABETALOL 100 MG/1
100 TABLET, FILM COATED ORAL TWICE DAILY
Status: DISCONTINUED | OUTPATIENT
Start: 2020-11-15 | End: 2020-11-16 | Stop reason: HOSPADM

## 2020-11-15 RX ORDER — OXYCODONE HYDROCHLORIDE 5 MG/1
5 TABLET ORAL EVERY 4 HOURS PRN
Status: DISCONTINUED | OUTPATIENT
Start: 2020-11-15 | End: 2020-11-16 | Stop reason: HOSPADM

## 2020-11-15 RX ORDER — OXYCODONE HYDROCHLORIDE 5 MG/1
5 TABLET ORAL EVERY 4 HOURS PRN
Qty: 20 TAB | Refills: 0 | Status: SHIPPED | OUTPATIENT
Start: 2020-11-15 | End: 2020-11-16

## 2020-11-15 RX ADMIN — DOCUSATE SODIUM 100 MG: 100 CAPSULE ORAL at 21:53

## 2020-11-15 RX ADMIN — DOCUSATE SODIUM 100 MG: 100 CAPSULE ORAL at 11:25

## 2020-11-15 RX ADMIN — IBUPROFEN 600 MG: 600 TABLET ORAL at 20:17

## 2020-11-15 RX ADMIN — VITAMIN A, VITAMIN C, VITAMIN D-3, VITAMIN E, VITAMIN B-1, VITAMIN B-2, NIACIN, VITAMIN B-6, CALCIUM, IRON, ZINC, COPPER 1 TABLET: 4000; 120; 400; 22; 1.84; 3; 20; 10; 1; 12; 200; 27; 25; 2 TABLET ORAL at 11:25

## 2020-11-15 RX ADMIN — LABETALOL HYDROCHLORIDE 100 MG: 100 TABLET, FILM COATED ORAL at 21:53

## 2020-11-15 RX ADMIN — OXYCODONE HYDROCHLORIDE 10 MG: 10 TABLET ORAL at 07:08

## 2020-11-15 RX ADMIN — IBUPROFEN 600 MG: 600 TABLET ORAL at 07:08

## 2020-11-15 RX ADMIN — IBUPROFEN 600 MG: 600 TABLET ORAL at 14:06

## 2020-11-15 RX ADMIN — IBUPROFEN 600 MG: 600 TABLET ORAL at 01:57

## 2020-11-15 ASSESSMENT — PAIN DESCRIPTION - PAIN TYPE
TYPE: ACUTE PAIN;SURGICAL PAIN
TYPE: ACUTE PAIN;SURGICAL PAIN
TYPE: SURGICAL PAIN
TYPE: SURGICAL PAIN
TYPE: ACUTE PAIN

## 2020-11-15 NOTE — DISCHARGE SUMMARY
DATE OF ADMISSION:  2020    DATE OF DISCHARGE:  11/15/2020    ADMITTING DIAGNOSES:  1.  Pregnancy at 36 and 1/7th weeks.  2.  Prior classical .  3.  Previous  x2.  4.  History of  birth x2.  5.  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    DISCHARGE DIAGNOSES:  1.  Pregnancy at 36 and 1/7th weeks.  2.  Prior classical .  3.  Previous  x2.  4.  History of  birth x2.  5.  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  6.  Status post repeat low transverse .    HOSPITAL COURSE IN DETAIL:  This patient was admitted on the aforementioned   day with the aforementioned diagnoses.  Repeat low transverse  was   performed without complication.  Findings include a male infant with Apgars of   8 and 8.  Patient recovered in stable condition.  Baby went to the NICU.  On   postpartum day #1, she was doing well without complaints, tolerating clears.    By postop day #2, she was tolerating regular diet.  Today, postop day #3, she   desires discharge home.    PHYSICAL EXAMINATION:  VITAL SIGNS:  She is afebrile.  Her vital signs are within normal limits.  ABDOMEN:  Soft with full fundus below the umbilicus.  Wound is clean, dry and   intact.  No erythema, induration or discharge.    ASSESSMENT:  At this time is postop day #3, status post repeat low transverse   , doing well, desires discharge home.    PLAN:  At this time:  1.  Discharge to home.  2.  Followup in 2 weeks.  3.  Pelvic rest.  4.  Lift precautions.  5.  Scripts for Motrin and Percocet consented and written.       ____________________________________     MD CHACHA Boggs / CLIFF    DD:  11/15/2020 07:47:25  DT:  11/15/2020 08:46:15    D#:  [id number]  Job#:  [id number]

## 2020-11-15 NOTE — PROGRESS NOTES
0700-Report received from Aby Ocasio RN. Assumed care of patient.  0800-Assessment completed.  Patient progressing according to plan of care.  Patient encouraged to call for any needs.  Discussed pain management. FOB at bedside. Electric breast pump settings reviewed with patient.

## 2020-11-15 NOTE — PROGRESS NOTES
Hospital Day : 3    S: doing well; no sx; desired dc; baby doing well in icn    O:  Vitals:    11/13/20 1800 11/14/20 0512 11/14/20 1800 11/15/20 0600   BP: 130/88 142/98 145/89 117/79   Pulse: 79 67 78 65   Resp: 18 18 18 17   Temp: 36.9 °C (98.4 °F) 36.5 °C (97.7 °F) 36.3 °C (97.4 °F) 36.6 °C (97.9 °F)   TempSrc: Temporal Temporal Temporal Temporal   SpO2: 98% 97% 100% 97%   Weight:       Height:           Recent Labs     11/12/20  1802   WBC 19.6*   RBC 3.80*   HEMOGLOBIN 12.1   HEMATOCRIT 36.1*   MCV 95.0   MCH 31.8   MCHC 33.5*   RDW 41.6   PLATELETCT 227   MPV 11.1             abd soft ff; cdi    A: pod 3 sp rltcs; doing well    P: dc

## 2020-11-15 NOTE — LACTATION NOTE
@7251 UMM met with MOB for follow-up visit, baby remains in NICU, MOB states pumping has been going well, she states she has been getting increasing volumes of milk when she pumps, she states understanding of proper pump use and settings, reminded to decrease speed from 80-60 after 2 minutes and to set suction to highest level that is still comfortable, she states she has been able to pump comfortably at a suction of 25-30%, reminded of the importance of pumping frequently and on a schedule, encouraged to leave time for a 5 hour stretch of sleep at night, MOB breast tissue was soft and pliable when assessed, breast tissue remains intact, no signs of mastitis noted    Plan:  Q 2-3 hours pump for 15 minutes  Pump at least 8 times every 24 hours  Leave time for a 5 hour stretch of sleep at night    MOB states she has Medicaid and is interested in signing up for WIC, WIC contact information provided, UMM will also put MOB on list to be sent to WIC tomorrow, MOB instructed to call WIC if she does not hear from them prior to her discharge, highly encouraged to rent a HG pump if unable to get signed up with WIC when she discharges, pump rental information provided, MOB informed staff can order her a hand pump if she is unable to sign up with WIC or rent a pump    Breast milk storage information provided    Zoom meeting information provided    Encouraged to call for assistance as needed

## 2020-11-15 NOTE — PROGRESS NOTES
Patient states she would like to stay until tomorrow. Infant is in NICU and patient is using electric breast pump. Dr. Rivas notified and discharge for today cancelled.

## 2020-11-16 VITALS
SYSTOLIC BLOOD PRESSURE: 158 MMHG | HEIGHT: 66 IN | TEMPERATURE: 98.6 F | WEIGHT: 138 LBS | HEART RATE: 63 BPM | OXYGEN SATURATION: 98 % | DIASTOLIC BLOOD PRESSURE: 97 MMHG | BODY MASS INDEX: 22.18 KG/M2 | RESPIRATION RATE: 18 BRPM

## 2020-11-16 LAB
ALBUMIN SERPL BCP-MCNC: 3.1 G/DL (ref 3.2–4.9)
ALBUMIN/GLOB SERPL: 1.3 G/DL
ALP SERPL-CCNC: 95 U/L (ref 30–99)
ALT SERPL-CCNC: 59 U/L (ref 2–50)
ANION GAP SERPL CALC-SCNC: 7 MMOL/L (ref 7–16)
AST SERPL-CCNC: 77 U/L (ref 12–45)
BASOPHILS # BLD AUTO: 0.2 % (ref 0–1.8)
BASOPHILS # BLD: 0.02 K/UL (ref 0–0.12)
BILIRUB SERPL-MCNC: 0.5 MG/DL (ref 0.1–1.5)
BUN SERPL-MCNC: 13 MG/DL (ref 8–22)
CALCIUM SERPL-MCNC: 8.9 MG/DL (ref 8.5–10.5)
CHLORIDE SERPL-SCNC: 104 MMOL/L (ref 96–112)
CO2 SERPL-SCNC: 26 MMOL/L (ref 20–33)
CREAT SERPL-MCNC: 0.54 MG/DL (ref 0.5–1.4)
EOSINOPHIL # BLD AUTO: 0.13 K/UL (ref 0–0.51)
EOSINOPHIL NFR BLD: 1.2 % (ref 0–6.9)
ERYTHROCYTE [DISTWIDTH] IN BLOOD BY AUTOMATED COUNT: 41.9 FL (ref 35.9–50)
GLOBULIN SER CALC-MCNC: 2.3 G/DL (ref 1.9–3.5)
GLUCOSE SERPL-MCNC: 126 MG/DL (ref 65–99)
HCT VFR BLD AUTO: 32.1 % (ref 37–47)
HGB BLD-MCNC: 10.8 G/DL (ref 12–16)
IMM GRANULOCYTES # BLD AUTO: 0.04 K/UL (ref 0–0.11)
IMM GRANULOCYTES NFR BLD AUTO: 0.4 % (ref 0–0.9)
LYMPHOCYTES # BLD AUTO: 2.4 K/UL (ref 1–4.8)
LYMPHOCYTES NFR BLD: 22.9 % (ref 22–41)
MCH RBC QN AUTO: 32 PG (ref 27–33)
MCHC RBC AUTO-ENTMCNC: 33.6 G/DL (ref 33.6–35)
MCV RBC AUTO: 95.3 FL (ref 81.4–97.8)
MONOCYTES # BLD AUTO: 0.83 K/UL (ref 0–0.85)
MONOCYTES NFR BLD AUTO: 7.9 % (ref 0–13.4)
NEUTROPHILS # BLD AUTO: 7.05 K/UL (ref 2–7.15)
NEUTROPHILS NFR BLD: 67.4 % (ref 44–72)
NRBC # BLD AUTO: 0 K/UL
NRBC BLD-RTO: 0 /100 WBC
PLATELET # BLD AUTO: 204 K/UL (ref 164–446)
PMV BLD AUTO: 10.2 FL (ref 9–12.9)
POTASSIUM SERPL-SCNC: 3.8 MMOL/L (ref 3.6–5.5)
PROT SERPL-MCNC: 5.4 G/DL (ref 6–8.2)
RBC # BLD AUTO: 3.37 M/UL (ref 4.2–5.4)
SODIUM SERPL-SCNC: 137 MMOL/L (ref 135–145)
URATE SERPL-MCNC: 5.5 MG/DL (ref 1.9–8.2)
WBC # BLD AUTO: 10.5 K/UL (ref 4.8–10.8)

## 2020-11-16 PROCEDURE — A9270 NON-COVERED ITEM OR SERVICE: HCPCS | Performed by: OBSTETRICS & GYNECOLOGY

## 2020-11-16 PROCEDURE — 84550 ASSAY OF BLOOD/URIC ACID: CPT

## 2020-11-16 PROCEDURE — 700102 HCHG RX REV CODE 250 W/ 637 OVERRIDE(OP): Performed by: OBSTETRICS & GYNECOLOGY

## 2020-11-16 PROCEDURE — 85025 COMPLETE CBC W/AUTO DIFF WBC: CPT

## 2020-11-16 PROCEDURE — 36415 COLL VENOUS BLD VENIPUNCTURE: CPT

## 2020-11-16 PROCEDURE — 80053 COMPREHEN METABOLIC PANEL: CPT

## 2020-11-16 RX ORDER — OXYCODONE HYDROCHLORIDE 5 MG/1
5 TABLET ORAL EVERY 4 HOURS PRN
Qty: 20 TAB | Refills: 0 | Status: SHIPPED | OUTPATIENT
Start: 2020-11-16 | End: 2020-11-21

## 2020-11-16 RX ORDER — LABETALOL 200 MG/1
200 TABLET, FILM COATED ORAL 2 TIMES DAILY
Qty: 60 TAB | Refills: 2 | Status: SHIPPED | OUTPATIENT
Start: 2020-11-16 | End: 2020-12-16

## 2020-11-16 RX ORDER — IBUPROFEN 600 MG/1
600 TABLET ORAL EVERY 6 HOURS
Qty: 30 TAB | Refills: 1 | Status: SHIPPED | OUTPATIENT
Start: 2020-11-16

## 2020-11-16 RX ORDER — LABETALOL 200 MG/1
200 TABLET, FILM COATED ORAL 2 TIMES DAILY
Qty: 60 TAB | Refills: 2 | Status: SHIPPED | OUTPATIENT
Start: 2020-11-16 | End: 2020-11-16 | Stop reason: SDUPTHER

## 2020-11-16 RX ADMIN — VITAMIN A, VITAMIN C, VITAMIN D-3, VITAMIN E, VITAMIN B-1, VITAMIN B-2, NIACIN, VITAMIN B-6, CALCIUM, IRON, ZINC, COPPER 1 TABLET: 4000; 120; 400; 22; 1.84; 3; 20; 10; 1; 12; 200; 27; 25; 2 TABLET ORAL at 10:00

## 2020-11-16 RX ADMIN — LABETALOL HYDROCHLORIDE 100 MG: 100 TABLET, FILM COATED ORAL at 05:35

## 2020-11-16 RX ADMIN — IBUPROFEN 600 MG: 600 TABLET ORAL at 15:53

## 2020-11-16 RX ADMIN — OXYCODONE HYDROCHLORIDE 5 MG: 5 TABLET ORAL at 00:02

## 2020-11-16 RX ADMIN — DOCUSATE SODIUM 100 MG: 100 CAPSULE ORAL at 15:53

## 2020-11-16 RX ADMIN — IBUPROFEN 600 MG: 600 TABLET ORAL at 02:04

## 2020-11-16 RX ADMIN — IBUPROFEN 600 MG: 600 TABLET, FILM COATED ORAL at 10:00

## 2020-11-16 ASSESSMENT — PAIN DESCRIPTION - PAIN TYPE
TYPE: ACUTE PAIN;SURGICAL PAIN
TYPE: SURGICAL PAIN;ACUTE PAIN
TYPE: ACUTE PAIN;SURGICAL PAIN

## 2020-11-16 NOTE — PROGRESS NOTES
Hospital Day : 4    S: doing well; no sx pih; baby in icn    O:  Vitals:    11/15/20 1730 11/15/20 2050 11/15/20 2353 11/16/20 0517   BP: (!) 161/97 134/96 122/83 134/81   Pulse: 80 72 65 72   Resp: 18 20 18 20   Temp: 36.4 °C (97.5 °F) 36.8 °C (98.3 °F) 37.1 °C (98.7 °F) 36.8 °C (98.2 °F)   TempSrc: Temporal Temporal Temporal Temporal   SpO2: 100% 100% 96% 97%   Weight:       Height:           Recent Labs     11/15/20  1845 11/16/20  0304   WBC 10.4 10.5   RBC 3.56* 3.37*   HEMOGLOBIN 11.6* 10.8*   HEMATOCRIT 34.7* 32.1*   MCV 97.5 95.3   MCH 32.6 32.0   MCHC 33.4* 33.6   RDW 42.8 41.9   PLATELETCT 199 204   MPV 10.0 10.2     Recent Labs     11/15/20  1845 11/16/20  0304   SODIUM 138 137   POTASSIUM 3.9 3.8   CHLORIDE 103 104   CO2 28 26   GLUCOSE 82 126*   BUN 12 13   CREATININE 0.75 0.54   CALCIUM 9.4 8.9         abd soft ff    A: pod 4 sp rltcs; labile bp and mildly elevated lft without other severe features; doing well on labatolol    P: will consider dc this pm if cont stable bp on labatolol

## 2020-11-16 NOTE — LACTATION NOTE
Mother reports her milk has increased in volume and her breasts are feeling comfortable, she denies breast engorgement. She reports her pump flanges are fitting well and her nipple and areola that were damaged are healing. She pumped 50ml per breast this morning after sleeping 6 hours. Reviewed pumping routine and recommended a sleep stretch of no more than 4-5 hours at night and reinforced importance of pumping at least 8 times per 24 hours, mother voices understanding. Discussed differences between hospital grade and personal pumps, at this time mother would like to use her personal pump at home and will plan to use hospital grade pump when visiting NICU. Her information was provided to [facility] this morning for screening and sign up. She is happy her milk is increasing and denies questions/concerns.

## 2020-11-16 NOTE — PROGRESS NOTES
Patient assessment completed. Discussed pain management plan and patient to request medication as needed. Patient denies dizziness and headaches; states she is voiding w/o difficulty and passing flatus. Reviewed plan of care, all questions answered, and rounding in place.

## 2020-11-16 NOTE — PROGRESS NOTES
Dr. Rivas called and notified of elevated blood pressure 161/97, heart rate 80, respiratory rate 18 and O2 saturation was 100% on room air. Patient was tearful but did not want to discuss what was wrong. Orders given for every 4 hours vital signs, CBC with differential, CMP, uric acid and urine for protein creatinine ratio. Patient denies headache, blurred vision or epigastric pain. Patient notified of plan of care.

## 2020-11-16 NOTE — PROGRESS NOTES
Dr. Rivas called and notified of pt lab results including urine protein creatinine ratio and recent set of VS. TORV to start pt on labetalol 100mg PO BID and repeat CBC, CMP, uric acid in the morning. Orders placed into Epic, pt updated on plan of care and questions answered. Nick Eaton R.N.

## 2020-11-16 NOTE — CARE PLAN
Problem: Alteration in comfort related to surgical incision and/or after birth pains  Goal: Patient is able to ambulate, care for self and infant with acceptable pain level  Outcome: PROGRESSING AS EXPECTED  Intervention: Administer pain meds as requested by patient and ordered by MD/DO/CNM  Note: Pt ambulating in room ad dereck without difficulty, reports adequate pain relief with ibuprofen scheduled and oxycodone prn for breakthrough pain     Problem: Potential knowledge deficit related to lack of understanding of self and  care  Goal: Patient will verbalize understanding of self and infant care  Outcome: PROGRESSING AS EXPECTED  Intervention: Assess patient and knowledge of self and infant care  Note: Pt caring for self and pumping for and visiting infant in NICU without difficulty, demonstrates appropriate understanding of postpartum care and pumping, questions answered as needed

## 2020-11-16 NOTE — PROGRESS NOTES
Dr. Atkinson updated on patient's 1000 and 1435 blood pressures. Dr. Working to place d/c order and prescription for 200 mg labetalol BID.

## 2020-11-16 NOTE — DISCHARGE INSTRUCTIONS
POSTPARTUM DISCHARGE INSTRUCTIONS FOR MOM    YOB: 1990   Age: 30 y.o.               Admit Date: 2020     Discharge Date: 2020  Attending Doctor:  Mary Lou Broderick M.D.                  Allergies:  Bee and Other food    Discharged to home by car. Discharged via wheelchair, hospital escort: Yes.  Special equipment needed: Not Applicable  Belongings with: Personal  Be sure to schedule a follow-up appointment with your primary care doctor or any specialists as instructed.     Discharge Plan:   Diet Plan: Discussed  Activity Level: Discussed  Confirmed Follow up Appointment: Patient to Call and Schedule Appointment  Confirmed Symptoms Management: Discussed  Medication Reconciliation Updated: Yes  Influenza Vaccine Indication: Not indicated: Previously immunized this influenza season and > 8 years of age    REASONS TO CALL YOUR OBSTETRICIAN:  1.   Persistent fever or shaking chills (Temperature higher than 100.4)  2.   Heavy bleeding (soaking more than 1 pad per hour); Passing clots  3.   Foul odor from vagina  4.   Mastitis (Breast infection; breast pain, chills, fever, redness)  5.   Urinary pain, burning or frequency  6.   Severe depression longer than 24 hours  7.   Abdominal incision infection    HAND WASHING  · Prior to handling the baby.  · Before breastfeeding or bottle feeding baby.  · After using the bathroom or changing the baby's diaper.    WOUND CARE  Ask your physician for additional care instructions.  In general:    ·  Incision:      · Keep clean and dry.    · Do NOT lift anything heavier than your baby for up to 6 weeks.    · There should not be any opening or pus.      VAGINAL CARE  · Nothing inside vagina for 6 weeks: no sexual intercourse, tampons or douching.  · Bleeding may continue for 2-4 weeks.  Amount may vary.    · Call your physician for heavy bleeding which means soaking more than 1 pad per hour    BIRTH CONTROL  · It is possible to become pregnant at any time  "after delivery and while breastfeeding.  · Plan to discuss a method of birth control with your physician at your follow up visit. visit.    DIET AND ELIMINATION  · Eating more fiber (bran cereal, fruits, and vegetables) and drinking plenty of fluids will help to avoid constipation.  · Urinary frequency after childbirth is normal.    POSTPARTUM BLUES  During the first few days after birth, you may experience a sense of the \"blues\" which may include impatience, irritability or even crying.  These feeling come and go quickly.  However, as many as 1 in 10 women experience emotional symptoms known as postpartum depression.    Postpartum depression:  May start as early as the second or third day after delivery or take several weeks or months to develop.  Symptoms of \"blues\" are present, but are more intense:  Crying spells; loss of appetite; feelings of hopelessness or loss of control; fear of touching the baby; over concern or no concern at all about the baby; little or no concern about your own appearance/caring for yourself; and/or inability to sleep or excessive sleeping.  Contact your physician if you are experiencing any of these symptoms.    Crisis Hotline:  · [place] Crisis Hotline:  6-223-LVXSGFW  Or 1-478.410.5054  · [place] Crisis Hotline:  1-414.468.3229  Or 717-758-6204    PREVENTING SHAKEN BABY:  If you are angry or stressed, PUT THE BABY IN THE CRIB, step away, take some deep breaths, and wait until you are calm to care for the baby.  DO NOT SHAKE THE BABY.  You are not alone, call a supporter for help.    · [facility] Center 24/7 crisis line 213-835-0718 or 1-890.225.7572  · You can also text them, text \"ANSWER\" to 811473    DEPRESSION / SUICIDE RISK:  As you are discharged from this [facility] facility, it is important to learn how to keep safe from harming yourself.    Recognize the warning signs:  · Abrupt changes in personality, positive or negative- including increase in energy   · Giving away " possessions  · Change in eating patterns- significant weight changes-  positive or negative  · Change in sleeping patterns- unable to sleep or sleeping all the time   · Unwillingness or inability to communicate  · Depression  · Unusual sadness, discouragement and loneliness  · Talk of wanting to die  · Neglect of personal appearance   · Rebelliousness- reckless behavior  · Withdrawal from people/activities they love  · Confusion- inability to concentrate     If you or a loved one observes any of these behaviors or has concerns about self-harm, here's what you can do:  · Talk about it- your feelings and reasons for harming yourself  · Remove any means that you might use to hurt yourself (examples: pills, rope, extension cords, firearm)  · Get professional help from the community (Mental Health, Substance Abuse, psychological counseling)  · Do not be alone:Call your Safe Contact- someone whom you trust who will be there for you.  · Call your local CRISIS HOTLINE 906-9894 or 906-430-3340  · Call your local Children's Mobile Crisis Response Team [place] (252) 114-9517 or [employer]  · Call the toll free National Suicide Prevention Hotlines   · National Suicide Prevention Lifeline 453-812-MCPN (2174)  · National Hope Line Network 800-SUICIDE (560-4585)    DISCHARGE SURVEY:  Thank you for choosing [facility].  We hope we provided you with very good care.  You may be receiving a survey in the mail.  Please fill it out.  Your opinion is valuable to us.    ADDITIONAL EDUCATIONAL MATERIALS GIVEN TO PATIENT: SHANTANU HANDOUT: Understanding Preeclampsia        My signature on this form indicates that:  1.  I have reviewed and understand the above information  2.  My questions regarding this information have been answered to my satisfaction.  3.  I have formulated a plan with my discharge nurse to obtain my prescribed medication for home.

## 2020-11-16 NOTE — CARE PLAN
Problem: Altered physiologic condition related to postoperative  delivery  Goal: Patient physiologically stable as evidenced by normal lochia, palpable uterine involution and vital signs within normal limits  Outcome: PROGRESSING AS EXPECTED     Problem: Alteration in comfort related to surgical incision and/or after birth pains  Goal: Patient verbalizes acceptable pain level  Outcome: PROGRESSING AS EXPECTED

## 2020-11-16 NOTE — CARE PLAN
Problem: Altered physiologic condition related to postoperative  delivery  Goal: Patient physiologically stable as evidenced by normal lochia, palpable uterine involution and vital signs within normal limits  Outcome: PROGRESSING AS EXPECTED  Note: Patient is physiologically stable as evidenced by scant lochia rubra, firm fundus two fingerbreadth below the umbilicus, and vital signs WDL. Will continue to monitor patient condition.       Problem: Potential for postpartum infection related to surgical incision, compromised uterine condition, urinary tract or respiratory compromise  Goal: Patient will be afebrile and free from signs and symptoms of infection  Outcome: PROGRESSING AS EXPECTED  Note: Patient is afebrile and absent for other signs/symptoms of infection. Vital signs WDL.  Will continue to monitor patient condition.

## 2020-11-17 NOTE — PROGRESS NOTES
Discharge education discussed with patient who verbalized understanding. Prescriptions handed to pt and reviewed. Pt verbalized understanding of follow-up appointments. Patient states she will notify her ride and let RN know when ready for d/c.

## (undated) DEVICE — SLEEVE, SEQUENTIAL CALF REG

## (undated) DEVICE — DRESSING, 4X4 VIGILON STERILE

## (undated) DEVICE — BAG SPONGE COUNT 10.25 X 32 - BLUE (250/CA)

## (undated) DEVICE — HEAD HOLDER JUNIOR/ADULT

## (undated) DEVICE — DRESSING NON-ADHERING 8 X 3 - (50/BX)

## (undated) DEVICE — SOLUTION PLASMA-LYTE PH 7.4 INJ 1000ML  (14EA/CA)

## (undated) DEVICE — SUTURE 4-0 MONOCRYL PLUS PS-1 - 27 INCH (36/BX)

## (undated) DEVICE — CHLORAPREP 26 ML APPLICATOR - ORANGE TINT(25/CA)

## (undated) DEVICE — GLOVE BIOGEL SZ 6 PF LATEX - (50EA/BX 4BX/CA)

## (undated) DEVICE — TRAY SPINAL ANESTHESIA NON-SAFETY (10/CA)

## (undated) DEVICE — SUTURE 0 VICRYL PLUS CT-1 - 36 INCH (36/BX)

## (undated) DEVICE — CATHETER IV NON-SAFETY 18 GA X 1 1/4 (50/BX 4BX/CA)

## (undated) DEVICE — NEEDLE SAFETY 25 GA X 5/8 IN LL HYPO (100/BX 12BX/CA) WAS #6351

## (undated) DEVICE — DETERGENT RENUZYME PLUS 10 OZ PACKET (50/BX)

## (undated) DEVICE — PACK C-SECTION (2EA/CA)

## (undated) DEVICE — WATER IRRIGATION STERILE 1000ML (12EA/CA)

## (undated) DEVICE — DRESSING POST OP BORDER 4 X 10 (5EA/BX)

## (undated) DEVICE — TAPE CLOTH MEDIPORE 6 INCH - (12RL/CA)

## (undated) DEVICE — TUBING CLEARLINK DUO-VENT - C-FLO (48EA/CA)

## (undated) DEVICE — CANISTER SUCTION 3000ML MECHANICAL FILTER AUTO SHUTOFF MEDI-VAC NONSTERILE LF DISP  (40EA/CA)

## (undated) DEVICE — PACK ROOM TURNOVER L&D (12/CA)

## (undated) DEVICE — SET EXTENSION WITH 2 PORTS (48EA/CA) ***PART #2C8610 IS A SUBSTITUTE*****

## (undated) DEVICE — SUTURE 1 VICRYL PLUS CTX - 36 INCH (36/BX)

## (undated) DEVICE — SUTURE 2-0 VICRYL PLUS CT-1 36 (36PK/BX)"

## (undated) DEVICE — CANISTER SUCTION RIGID RED 1500CC (40EA/CA)

## (undated) DEVICE — GLOVE BIOGEL INDICATOR SZ 6.5 SURGICAL PF LTX - (50PR/BX 4BX/CA)

## (undated) DEVICE — KIT  I.V. START (100EA/CA)

## (undated) DEVICE — SODIUM CHL IRRIGATION 0.9% 1000ML (12EA/CA)